# Patient Record
Sex: MALE | Race: ASIAN | NOT HISPANIC OR LATINO | Employment: FULL TIME | URBAN - METROPOLITAN AREA
[De-identification: names, ages, dates, MRNs, and addresses within clinical notes are randomized per-mention and may not be internally consistent; named-entity substitution may affect disease eponyms.]

---

## 2020-01-22 ENCOUNTER — OFFICE VISIT - HEALTHEAST (OUTPATIENT)
Dept: FAMILY MEDICINE | Facility: CLINIC | Age: 50
End: 2020-01-22

## 2020-01-22 DIAGNOSIS — R73.9 HYPERGLYCEMIA: ICD-10-CM

## 2020-01-22 DIAGNOSIS — R10.13 EPIGASTRIC PAIN: ICD-10-CM

## 2020-01-22 DIAGNOSIS — Z09 FOLLOW-UP EXAMINATION: ICD-10-CM

## 2020-01-22 LAB
ALBUMIN SERPL-MCNC: 3.6 G/DL (ref 3.5–5)
ALP SERPL-CCNC: 83 U/L (ref 45–120)
ALT SERPL W P-5'-P-CCNC: 39 U/L (ref 0–45)
ANION GAP SERPL CALCULATED.3IONS-SCNC: 11 MMOL/L (ref 5–18)
AST SERPL W P-5'-P-CCNC: 24 U/L (ref 0–40)
BILIRUB SERPL-MCNC: 0.6 MG/DL (ref 0–1)
BUN SERPL-MCNC: 16 MG/DL (ref 8–22)
CALCIUM SERPL-MCNC: 8.7 MG/DL (ref 8.5–10.5)
CHLORIDE BLD-SCNC: 101 MMOL/L (ref 98–107)
CHOLEST SERPL-MCNC: 224 MG/DL
CO2 SERPL-SCNC: 24 MMOL/L (ref 22–31)
CREAT SERPL-MCNC: 0.86 MG/DL (ref 0.7–1.3)
ERYTHROCYTE [DISTWIDTH] IN BLOOD BY AUTOMATED COUNT: 11.3 % (ref 11–14.5)
FASTING STATUS PATIENT QL REPORTED: YES
GFR SERPL CREATININE-BSD FRML MDRD: >60 ML/MIN/1.73M2
GLUCOSE BLD-MCNC: 296 MG/DL (ref 70–125)
HBA1C MFR BLD: >14 % (ref 3.5–6)
HCT VFR BLD AUTO: 42.8 % (ref 40–54)
HDLC SERPL-MCNC: 49 MG/DL
HGB BLD-MCNC: 14.6 G/DL (ref 14–18)
LDLC SERPL CALC-MCNC: 147 MG/DL
MCH RBC QN AUTO: 28.4 PG (ref 27–34)
MCHC RBC AUTO-ENTMCNC: 34.1 G/DL (ref 32–36)
MCV RBC AUTO: 83 FL (ref 80–100)
PLATELET # BLD AUTO: 200 THOU/UL (ref 140–440)
PMV BLD AUTO: 7.2 FL (ref 7–10)
POTASSIUM BLD-SCNC: 4.6 MMOL/L (ref 3.5–5)
PROT SERPL-MCNC: 7.6 G/DL (ref 6–8)
RBC # BLD AUTO: 5.13 MILL/UL (ref 4.4–6.2)
SODIUM SERPL-SCNC: 136 MMOL/L (ref 136–145)
TRIGL SERPL-MCNC: 141 MG/DL
TSH SERPL DL<=0.005 MIU/L-ACNC: 1.75 UIU/ML (ref 0.3–5)
WBC: 4.5 THOU/UL (ref 4–11)

## 2020-01-22 ASSESSMENT — MIFFLIN-ST. JEOR: SCORE: 1328.67

## 2020-01-24 ENCOUNTER — COMMUNICATION - HEALTHEAST (OUTPATIENT)
Dept: FAMILY MEDICINE | Facility: CLINIC | Age: 50
End: 2020-01-24

## 2020-01-29 ENCOUNTER — OFFICE VISIT - HEALTHEAST (OUTPATIENT)
Dept: PHARMACY | Facility: CLINIC | Age: 50
End: 2020-01-29

## 2020-01-29 ENCOUNTER — OFFICE VISIT - HEALTHEAST (OUTPATIENT)
Dept: FAMILY MEDICINE | Facility: CLINIC | Age: 50
End: 2020-01-29

## 2020-01-29 DIAGNOSIS — E11.65 TYPE 2 DIABETES MELLITUS WITH HYPERGLYCEMIA, WITHOUT LONG-TERM CURRENT USE OF INSULIN (H): ICD-10-CM

## 2020-01-29 ASSESSMENT — MIFFLIN-ST. JEOR: SCORE: 1322.81

## 2020-02-06 ENCOUNTER — RECORDS - HEALTHEAST (OUTPATIENT)
Dept: ADMINISTRATIVE | Facility: OTHER | Age: 50
End: 2020-02-06

## 2020-02-12 ENCOUNTER — RECORDS - HEALTHEAST (OUTPATIENT)
Dept: ADMINISTRATIVE | Facility: OTHER | Age: 50
End: 2020-02-12

## 2020-02-14 ENCOUNTER — OFFICE VISIT - HEALTHEAST (OUTPATIENT)
Dept: PHARMACY | Facility: CLINIC | Age: 50
End: 2020-02-14

## 2020-02-14 ENCOUNTER — COMMUNICATION - HEALTHEAST (OUTPATIENT)
Dept: FAMILY MEDICINE | Facility: CLINIC | Age: 50
End: 2020-02-14

## 2020-02-14 DIAGNOSIS — E78.2 MIXED HYPERLIPIDEMIA: ICD-10-CM

## 2020-02-14 DIAGNOSIS — R73.9 HYPERGLYCEMIA: ICD-10-CM

## 2020-02-14 DIAGNOSIS — E11.65 TYPE 2 DIABETES MELLITUS WITH HYPERGLYCEMIA, WITHOUT LONG-TERM CURRENT USE OF INSULIN (H): ICD-10-CM

## 2020-02-14 DIAGNOSIS — K85.90 ACUTE PANCREATITIS, UNSPECIFIED COMPLICATION STATUS, UNSPECIFIED PANCREATITIS TYPE: ICD-10-CM

## 2020-02-14 LAB
CREAT UR-MCNC: 40.6 MG/DL
MICROALBUMIN UR-MCNC: 10.43 MG/DL (ref 0–1.99)
MICROALBUMIN/CREAT UR: 256.9 MG/G

## 2020-02-20 ENCOUNTER — OFFICE VISIT - HEALTHEAST (OUTPATIENT)
Dept: FAMILY MEDICINE | Facility: CLINIC | Age: 50
End: 2020-02-20

## 2020-02-20 DIAGNOSIS — Z11.4 SCREENING FOR HIV (HUMAN IMMUNODEFICIENCY VIRUS): ICD-10-CM

## 2020-02-20 DIAGNOSIS — R80.9 MICROALBUMINURIA: ICD-10-CM

## 2020-02-20 DIAGNOSIS — K85.90 ACUTE PANCREATITIS, UNSPECIFIED COMPLICATION STATUS, UNSPECIFIED PANCREATITIS TYPE: ICD-10-CM

## 2020-02-20 DIAGNOSIS — E78.2 MIXED HYPERLIPIDEMIA: ICD-10-CM

## 2020-02-20 DIAGNOSIS — E11.65 TYPE 2 DIABETES MELLITUS WITH HYPERGLYCEMIA, WITHOUT LONG-TERM CURRENT USE OF INSULIN (H): ICD-10-CM

## 2020-02-20 DIAGNOSIS — Z09 HOSPITAL DISCHARGE FOLLOW-UP: ICD-10-CM

## 2020-02-20 DIAGNOSIS — Z23 IMMUNIZATION DUE: ICD-10-CM

## 2020-02-20 DIAGNOSIS — K81.0 ACUTE CHOLECYSTITIS: ICD-10-CM

## 2020-02-20 LAB
ALBUMIN SERPL-MCNC: 3.5 G/DL (ref 3.5–5)
ALP SERPL-CCNC: 127 U/L (ref 45–120)
ALT SERPL W P-5'-P-CCNC: 42 U/L (ref 0–45)
ANION GAP SERPL CALCULATED.3IONS-SCNC: 12 MMOL/L (ref 5–18)
AST SERPL W P-5'-P-CCNC: 22 U/L (ref 0–40)
BILIRUB DIRECT SERPL-MCNC: 0.2 MG/DL
BILIRUB SERPL-MCNC: 0.5 MG/DL (ref 0–1)
BUN SERPL-MCNC: 23 MG/DL (ref 8–22)
CALCIUM SERPL-MCNC: 8.9 MG/DL (ref 8.5–10.5)
CHLORIDE BLD-SCNC: 104 MMOL/L (ref 98–107)
CO2 SERPL-SCNC: 25 MMOL/L (ref 22–31)
CREAT SERPL-MCNC: 1.09 MG/DL (ref 0.7–1.3)
GFR SERPL CREATININE-BSD FRML MDRD: >60 ML/MIN/1.73M2
GLUCOSE BLD-MCNC: 152 MG/DL (ref 70–125)
HIV 1+2 AB+HIV1 P24 AG SERPL QL IA: NEGATIVE
POTASSIUM BLD-SCNC: 4.2 MMOL/L (ref 3.5–5)
PROT SERPL-MCNC: 7.5 G/DL (ref 6–8)
SODIUM SERPL-SCNC: 141 MMOL/L (ref 136–145)

## 2020-02-20 RX ORDER — LOSARTAN POTASSIUM 25 MG/1
25 TABLET ORAL DAILY
Qty: 90 TABLET | Refills: 1 | Status: SHIPPED | OUTPATIENT
Start: 2020-02-20 | End: 2021-06-16

## 2020-02-24 ENCOUNTER — COMMUNICATION - HEALTHEAST (OUTPATIENT)
Dept: FAMILY MEDICINE | Facility: CLINIC | Age: 50
End: 2020-02-24

## 2020-03-05 ENCOUNTER — OFFICE VISIT - HEALTHEAST (OUTPATIENT)
Dept: FAMILY MEDICINE | Facility: CLINIC | Age: 50
End: 2020-03-05

## 2020-03-05 ENCOUNTER — RECORDS - HEALTHEAST (OUTPATIENT)
Dept: ADMINISTRATIVE | Facility: OTHER | Age: 50
End: 2020-03-05

## 2020-03-05 DIAGNOSIS — R80.9 MICROALBUMINURIA: ICD-10-CM

## 2020-03-05 DIAGNOSIS — E11.65 TYPE 2 DIABETES MELLITUS WITH HYPERGLYCEMIA, WITHOUT LONG-TERM CURRENT USE OF INSULIN (H): ICD-10-CM

## 2020-03-05 DIAGNOSIS — K81.0 ACUTE CHOLECYSTITIS: ICD-10-CM

## 2020-03-05 DIAGNOSIS — K85.90 ACUTE PANCREATITIS, UNSPECIFIED COMPLICATION STATUS, UNSPECIFIED PANCREATITIS TYPE: ICD-10-CM

## 2020-03-06 ENCOUNTER — AMBULATORY - HEALTHEAST (OUTPATIENT)
Dept: FAMILY MEDICINE | Facility: CLINIC | Age: 50
End: 2020-03-06

## 2020-03-06 ENCOUNTER — AMBULATORY - HEALTHEAST (OUTPATIENT)
Dept: EDUCATION SERVICES | Facility: CLINIC | Age: 50
End: 2020-03-06

## 2020-03-06 DIAGNOSIS — E11.65 TYPE 2 DIABETES MELLITUS WITH HYPERGLYCEMIA, WITHOUT LONG-TERM CURRENT USE OF INSULIN (H): ICD-10-CM

## 2020-03-17 ENCOUNTER — AMBULATORY - HEALTHEAST (OUTPATIENT)
Dept: FAMILY MEDICINE | Facility: CLINIC | Age: 50
End: 2020-03-17

## 2020-03-17 DIAGNOSIS — E11.65 TYPE 2 DIABETES MELLITUS WITH HYPERGLYCEMIA, WITHOUT LONG-TERM CURRENT USE OF INSULIN (H): ICD-10-CM

## 2020-03-26 ENCOUNTER — OFFICE VISIT - HEALTHEAST (OUTPATIENT)
Dept: FAMILY MEDICINE | Facility: CLINIC | Age: 50
End: 2020-03-26

## 2020-03-26 DIAGNOSIS — K85.90 ACUTE PANCREATITIS, UNSPECIFIED COMPLICATION STATUS, UNSPECIFIED PANCREATITIS TYPE: ICD-10-CM

## 2020-03-26 DIAGNOSIS — K81.0 ACUTE CHOLECYSTITIS: ICD-10-CM

## 2020-03-26 DIAGNOSIS — R73.9 HYPERGLYCEMIA: ICD-10-CM

## 2020-03-26 DIAGNOSIS — R80.9 MICROALBUMINURIA: ICD-10-CM

## 2020-03-26 DIAGNOSIS — E11.65 TYPE 2 DIABETES MELLITUS WITH HYPERGLYCEMIA, WITHOUT LONG-TERM CURRENT USE OF INSULIN (H): ICD-10-CM

## 2020-04-10 ENCOUNTER — AMBULATORY - HEALTHEAST (OUTPATIENT)
Dept: PHARMACY | Facility: CLINIC | Age: 50
End: 2020-04-10

## 2020-04-10 DIAGNOSIS — R73.9 HYPERGLYCEMIA: ICD-10-CM

## 2020-04-10 DIAGNOSIS — E11.65 TYPE 2 DIABETES MELLITUS WITH HYPERGLYCEMIA, WITHOUT LONG-TERM CURRENT USE OF INSULIN (H): ICD-10-CM

## 2020-04-10 RX ORDER — ATORVASTATIN CALCIUM 40 MG/1
40 TABLET, FILM COATED ORAL DAILY
Qty: 90 TABLET | Refills: 2 | Status: SHIPPED | OUTPATIENT
Start: 2020-04-10 | End: 2021-06-16

## 2020-08-25 ENCOUNTER — COMMUNICATION - HEALTHEAST (OUTPATIENT)
Dept: FAMILY MEDICINE | Facility: CLINIC | Age: 50
End: 2020-08-25

## 2020-11-23 ENCOUNTER — OFFICE VISIT - HEALTHEAST (OUTPATIENT)
Dept: FAMILY MEDICINE | Facility: CLINIC | Age: 50
End: 2020-11-23

## 2020-11-23 DIAGNOSIS — K81.9 CHOLECYSTITIS: ICD-10-CM

## 2020-11-23 DIAGNOSIS — E78.2 MIXED HYPERLIPIDEMIA: ICD-10-CM

## 2020-11-23 DIAGNOSIS — R80.9 MICROALBUMINURIA: ICD-10-CM

## 2020-11-23 DIAGNOSIS — E11.65 TYPE 2 DIABETES MELLITUS WITH HYPERGLYCEMIA, WITHOUT LONG-TERM CURRENT USE OF INSULIN (H): ICD-10-CM

## 2020-11-23 DIAGNOSIS — Z87.19 HISTORY OF PANCREATITIS: ICD-10-CM

## 2020-11-23 DIAGNOSIS — Z12.11 SCREEN FOR COLON CANCER: ICD-10-CM

## 2020-11-23 LAB
ALBUMIN SERPL-MCNC: 3.9 G/DL (ref 3.5–5)
ALP SERPL-CCNC: 92 U/L (ref 45–120)
ALT SERPL W P-5'-P-CCNC: 50 U/L (ref 0–45)
ANION GAP SERPL CALCULATED.3IONS-SCNC: 10 MMOL/L (ref 5–18)
AST SERPL W P-5'-P-CCNC: 30 U/L (ref 0–40)
BILIRUB SERPL-MCNC: 0.4 MG/DL (ref 0–1)
BUN SERPL-MCNC: 27 MG/DL (ref 8–22)
CALCIUM SERPL-MCNC: 9.2 MG/DL (ref 8.5–10.5)
CHLORIDE BLD-SCNC: 104 MMOL/L (ref 98–107)
CO2 SERPL-SCNC: 23 MMOL/L (ref 22–31)
CREAT SERPL-MCNC: 1.15 MG/DL (ref 0.7–1.3)
CREAT UR-MCNC: 30 MG/DL
GFR SERPL CREATININE-BSD FRML MDRD: >60 ML/MIN/1.73M2
GLUCOSE BLD-MCNC: 254 MG/DL (ref 70–125)
HBA1C MFR BLD: 9.7 %
LIPASE SERPL-CCNC: 76 U/L (ref 0–52)
MICROALBUMIN UR-MCNC: 12.71 MG/DL (ref 0–1.99)
MICROALBUMIN/CREAT UR: 423.7 MG/G
POTASSIUM BLD-SCNC: 4.9 MMOL/L (ref 3.5–5)
PROT SERPL-MCNC: 7.5 G/DL (ref 6–8)
SODIUM SERPL-SCNC: 137 MMOL/L (ref 136–145)

## 2020-11-23 RX ORDER — GLUCOSAMINE HCL/CHONDROITIN SU 500-400 MG
CAPSULE ORAL
Qty: 100 STRIP | Refills: 6 | Status: SHIPPED | OUTPATIENT
Start: 2020-11-23

## 2020-11-23 RX ORDER — GLIPIZIDE 5 MG/1
5 TABLET ORAL 2 TIMES DAILY
Qty: 180 TABLET | Refills: 3 | Status: SHIPPED | OUTPATIENT
Start: 2020-11-23 | End: 2021-06-16

## 2020-11-24 ENCOUNTER — RECORDS - HEALTHEAST (OUTPATIENT)
Dept: RADIOLOGY | Facility: CLINIC | Age: 50
End: 2020-11-24

## 2020-11-24 ENCOUNTER — HOSPITAL ENCOUNTER (OUTPATIENT)
Dept: ULTRASOUND IMAGING | Facility: HOSPITAL | Age: 50
Discharge: HOME | End: 2020-11-24
Attending: PHYSICIAN ASSISTANT

## 2020-11-24 DIAGNOSIS — K81.9 CHOLECYSTITIS: ICD-10-CM

## 2020-11-25 ENCOUNTER — OFFICE VISIT - HEALTHEAST (OUTPATIENT)
Dept: SURGERY | Facility: CLINIC | Age: 50
End: 2020-11-25

## 2020-11-25 DIAGNOSIS — R10.11 ABDOMINAL PAIN, RIGHT UPPER QUADRANT: ICD-10-CM

## 2021-01-04 ENCOUNTER — COMMUNICATION - HEALTHEAST (OUTPATIENT)
Dept: FAMILY MEDICINE | Facility: CLINIC | Age: 51
End: 2021-01-04

## 2021-05-27 ENCOUNTER — RECORDS - HEALTHEAST (OUTPATIENT)
Dept: ADMINISTRATIVE | Facility: CLINIC | Age: 51
End: 2021-05-27

## 2021-05-27 VITALS — DIASTOLIC BLOOD PRESSURE: 73 MMHG | HEART RATE: 80 BPM | SYSTOLIC BLOOD PRESSURE: 118 MMHG

## 2021-06-02 ENCOUNTER — RECORDS - HEALTHEAST (OUTPATIENT)
Dept: ADMINISTRATIVE | Facility: CLINIC | Age: 51
End: 2021-06-02

## 2021-06-04 VITALS
DIASTOLIC BLOOD PRESSURE: 79 MMHG | RESPIRATION RATE: 16 BRPM | HEIGHT: 61 IN | SYSTOLIC BLOOD PRESSURE: 132 MMHG | BODY MASS INDEX: 25.3 KG/M2 | WEIGHT: 134 LBS | HEART RATE: 92 BPM

## 2021-06-04 VITALS
DIASTOLIC BLOOD PRESSURE: 70 MMHG | TEMPERATURE: 97.7 F | WEIGHT: 135 LBS | OXYGEN SATURATION: 98 % | SYSTOLIC BLOOD PRESSURE: 122 MMHG | BODY MASS INDEX: 25.16 KG/M2 | HEART RATE: 86 BPM

## 2021-06-04 VITALS
BODY MASS INDEX: 24.84 KG/M2 | TEMPERATURE: 98.7 F | OXYGEN SATURATION: 97 % | HEIGHT: 62 IN | RESPIRATION RATE: 12 BRPM | HEART RATE: 82 BPM | SYSTOLIC BLOOD PRESSURE: 118 MMHG | WEIGHT: 135 LBS | DIASTOLIC BLOOD PRESSURE: 68 MMHG

## 2021-06-04 VITALS
WEIGHT: 135.02 LBS | HEART RATE: 77 BPM | OXYGEN SATURATION: 97 % | TEMPERATURE: 98.1 F | DIASTOLIC BLOOD PRESSURE: 78 MMHG | SYSTOLIC BLOOD PRESSURE: 136 MMHG | BODY MASS INDEX: 25.17 KG/M2

## 2021-06-04 VITALS — WEIGHT: 134 LBS | BODY MASS INDEX: 24.98 KG/M2

## 2021-06-05 VITALS
BODY MASS INDEX: 24.79 KG/M2 | WEIGHT: 133 LBS | SYSTOLIC BLOOD PRESSURE: 139 MMHG | HEART RATE: 75 BPM | DIASTOLIC BLOOD PRESSURE: 78 MMHG

## 2021-06-05 NOTE — PROGRESS NOTES
"ASSESSMENT/PLAN:  1. Type 2 diabetes mellitus with hyperglycemia, without long-term current use of insulin (H)  Ambulatory referral to Diabetes Education Program (CDE)    insulin glargine (LANTUS SOLOSTAR PEN) 100 unit/mL (3 mL) pen    pen needle, diabetic (BD ULTRA-FINE WALKER PEN NEEDLE) 32 gauge x 5/32\" Ndle    blood glucose meter (GLUCOMETER)    blood glucose test strips    generic lancets (FINGERSTIX LANCETS)       This is a 50 yo male here for follow up on diabetes; was seen in ER last week ([place]):   I have reviewed available ER records,  notes, as well as imaging and lab results.  In addition I have reviewed the medication list and made any adjustments as indicated in orders.  He was seen for abdominal pain - no clear finding - but did have hyperglycemia - comes in for evaluation/treatment.    Type II DM - Will refer to diabetes education .  Will add refills on Lantus - will make sure he has blood sugar monitoring equipment (refills sent).  He is also on Metformin - encourage compliance.     Return in about 3 months (around 4/29/2020) for Recheck.      There are no discontinued medications.  There are no Patient Instructions on file for this visit.    Chief Complaint:  Chief Complaint   Patient presents with     Follow-up     labs        HPI:   Pérez Dumont is a 49 y.o. male c/o  Here for recheck diabetes - was seen in ER ([facility]) last week 1/20/2020  Started Metformin - taking it   No hypoglycemia  No foot lesions; no numbness/tingling    No fam hx or prev hx diabetes        PMH:   Patient Active Problem List    Diagnosis Date Noted     Type 2 diabetes mellitus with hyperglycemia, without long-term current use of insulin (H) 01/29/2020     Mixed Hyperlipoproteinemia      Allergic Rhinitis      Constipation      History reviewed. No pertinent past medical history.  History reviewed. No pertinent surgical history.  Social History     Socioeconomic History     Marital status:      Spouse name: Not on " file     Number of children: Not on file     Years of education: Not on file     Highest education level: Not on file   Occupational History     Not on file   Social Needs     Financial resource strain: Not on file     Food insecurity:     Worry: Not on file     Inability: Not on file     Transportation needs:     Medical: Not on file     Non-medical: Not on file   Tobacco Use     Smoking status: Never Smoker     Smokeless tobacco: Never Used   Substance and Sexual Activity     Alcohol use: Not on file     Drug use: Not on file     Sexual activity: Not on file   Lifestyle     Physical activity:     Days per week: Not on file     Minutes per session: Not on file     Stress: Not on file   Relationships     Social connections:     Talks on phone: Not on file     Gets together: Not on file     Attends Jew service: Not on file     Active member of club or organization: Not on file     Attends meetings of clubs or organizations: Not on file     Relationship status: Not on file     Intimate partner violence:     Fear of current or ex partner: Not on file     Emotionally abused: Not on file     Physically abused: Not on file     Forced sexual activity: Not on file   Other Topics Concern     Not on file   Social History Narrative     Not on file     History reviewed. No pertinent family history.    Meds:    Current Outpatient Medications:      metFORMIN (GLUCOPHAGE) 500 MG tablet, Take 1 tablet (500 mg total) by mouth 2 (two) times a day with meals., Disp: 60 tablet, Rfl: 0     ranitidine (ZANTAC) 150 MG capsule, Take 2 capsules (300 mg total) by mouth every evening., Disp: 60 capsule, Rfl: 0     blood glucose meter (GLUCOMETER), Use 1 each As Directed as needed. Dispense glucometer brand per patient's insurance at pharmacy discretion., Disp: 1 each, Rfl: 0     blood glucose test strips, Use 1 each As Directed as needed. Dispense brand per patient's insurance at pharmacy discretion., Disp: 100 strip, Rfl: 6     generic  "lancets (FINGERSTIX LANCETS), Dispense brand per patient's insurance at pharmacy discretion., Disp: 100 each, Rfl: 6     insulin glargine (LANTUS SOLOSTAR PEN) 100 unit/mL (3 mL) pen, Inject 10 Units under the skin at bedtime., Disp: 5 adj dose pen, Rfl: PRN     pen needle, diabetic (BD ULTRA-FINE WALKER PEN NEEDLE) 32 gauge x 5/32\" Ndle, Use to inject insulin once daily, Disp: 100 each, Rfl: 11    Allergies:  No Known Allergies    ROS:  Pertinent positives as noted in HPI; otherwise 12 point ROS negative.      Physical Exam:  EXAM:  /79 (Patient Site: Right Arm, Patient Position: Sitting, Cuff Size: Adult Regular)   Pulse 92   Resp 16   Ht 5' 1.42\" (1.56 m)   Wt 134 lb (60.8 kg)   BMI 24.98 kg/m     Gen:  NAD, appears well, well-hydrated  HEENT:  TMs nl, oropharynx benign, nasal mucosa nl, conjunctiva clear  Neck:  Supple, no adenopathy, no thyromegaly, no carotid bruits, no JVD  Lungs:  Clear to auscultation bilaterally  Cor:  RRR no murmur  Abd:  Soft, nontender, BS+, no masses, no guarding or rebound, no HSM  Extr:  Neg.  Diabetic foot exam: normal DP and PT pulses, no trophic changes or ulcerative lesions and normal sensory exam  Neuro:  No asymmetry  Skin:  Warm/dry        Results:  Results for orders placed or performed in visit on 01/22/20   Glycosylated Hemoglobin A1c   Result Value Ref Range    Hemoglobin A1c >14.0 (H) 3.5 - 6.0 %   Thyroid Stimulating Hormone (TSH)   Result Value Ref Range    TSH 1.75 0.30 - 5.00 uIU/mL   HM2(CBC w/o Differential)   Result Value Ref Range    WBC 4.5 4.0 - 11.0 thou/uL    RBC 5.13 4.40 - 6.20 mill/uL    Hemoglobin 14.6 14.0 - 18.0 g/dL    Hematocrit 42.8 40.0 - 54.0 %    MCV 83 80 - 100 fL    MCH 28.4 27.0 - 34.0 pg    MCHC 34.1 32.0 - 36.0 g/dL    RDW 11.3 11.0 - 14.5 %    Platelets 200 140 - 440 thou/uL    MPV 7.2 7.0 - 10.0 fL   Lipid Cascade   Result Value Ref Range    Cholesterol 224 (H) <=199 mg/dL    Triglycerides 141 <=149 mg/dL    HDL Cholesterol 49 >=40 " mg/dL    LDL Calculated 147 (H) <=129 mg/dL    Patient Fasting > 8hrs? Yes    Comprehensive Metabolic Panel   Result Value Ref Range    Sodium 136 136 - 145 mmol/L    Potassium 4.6 3.5 - 5.0 mmol/L    Chloride 101 98 - 107 mmol/L    CO2 24 22 - 31 mmol/L    Anion Gap, Calculation 11 5 - 18 mmol/L    Glucose 296 (H) 70 - 125 mg/dL    BUN 16 8 - 22 mg/dL    Creatinine 0.86 0.70 - 1.30 mg/dL    GFR MDRD Af Amer >60 >60 mL/min/1.73m2    GFR MDRD Non Af Amer >60 >60 mL/min/1.73m2    Bilirubin, Total 0.6 0.0 - 1.0 mg/dL    Calcium 8.7 8.5 - 10.5 mg/dL    Protein, Total 7.6 6.0 - 8.0 g/dL    Albumin 3.6 3.5 - 5.0 g/dL    Alkaline Phosphatase 83 45 - 120 U/L    AST 24 0 - 40 U/L    ALT 39 0 - 45 U/L

## 2021-06-05 NOTE — PROGRESS NOTES
Please call patient and inform:  Your diabetes lab is showing that it is very poorly controlled. I think we are going to need more than just the Metformin. I would also like to start you on once daily insulin just until we get things under better control. In addition we should start on a cholesterol medication. Would recommend you follow up soon to discuss. We can send you to the diabetic educator to discuss the insulin and demonstrate use.

## 2021-06-05 NOTE — PROGRESS NOTES
[facility] clinic EXAM note      Chief Complaint   Patient presents with     Hospital Visit Follow Up     abdominal pain , high blood sugar 400 in the ER     Concerns     feeling better today now that he isn't eating heavy food     Concerns     needs work note       Assessment & Plan    Pérez was seen today for hospital visit follow up, concerns and concerns.    Diagnoses and all orders for this visit:    Hyperglycemia:  Newly diagnosed diabetes. Assess LFT and kidney function. Check A1c. Assess thyroid. Assess for infection as contributing to hyperglycemia. Continue on Metformin for now. At the latest f/u in 3 months for recheck pending labs today. Reviewed diet changes that need to happen. Handout provided.    -     Glycosylated Hemoglobin A1c  -     Thyroid Stimulating Hormone (TSH)  -     HM2(CBC w/o Differential)  -     Lipid Cascade  -     Comprehensive Metabolic Panel    Epigastric pain:  Much improved. Suspect heartburn/gastritis vs new onset diabetes. No red flag s/s at this time. Continue on Ranitidine. I assume if he was able to pick this up it was not one of the recalled formularies.     Follow-up examination:  Symptoms improved as above.         History    Pérez Dumont is a 49 y.o.  male who presents for the following issues:    Establishing Care: Previously seen at this clinic by Dr. Lockett. Has been >3 years. States he was previously healthy so wasn't following up.    Concerns Today: F/u ED visit on 1/20 for abdominal pain  Feels a lot better today. After not eating heavy food. Just a litte pain central chest.   No N/V/D. No hematochezia, melena  NO fevers.   No CP or shortness of breath.   Just epigastric pain. Improved with meds.   No numbness or tingling  No neuropathy  Told BS was very high at the ED. Believes its from Drinking energy drinks.   Told no pop or energy drinks.    Feels like he is a pretty healthy so hasn' been seen.     I have reviewed available ED records,  notes,  as well as imaging  and lab results.      mEDICATIONS    Current Outpatient Medications on File Prior to Visit   Medication Sig Dispense Refill     metFORMIN (GLUCOPHAGE) 500 MG tablet Take 1 tablet (500 mg total) by mouth 2 (two) times a day with meals. 60 tablet 0     ranitidine (ZANTAC) 150 MG capsule Take 2 capsules (300 mg total) by mouth every evening. 60 capsule 0     No current facility-administered medications on file prior to visit.        Pertinent past medical, surgical, social and family history reviewed and updated in Epic.    Social History     Socioeconomic History     Marital status:      Spouse name: Not on file     Number of children: Not on file     Years of education: Not on file     Highest education level: Not on file   Occupational History     Not on file   Social Needs     Financial resource strain: Not on file     Food insecurity:     Worry: Not on file     Inability: Not on file     Transportation needs:     Medical: Not on file     Non-medical: Not on file   Tobacco Use     Smoking status: Never Smoker     Smokeless tobacco: Never Used   Substance and Sexual Activity     Alcohol use: Not on file     Drug use: Not on file     Sexual activity: Not on file   Lifestyle     Physical activity:     Days per week: Not on file     Minutes per session: Not on file     Stress: Not on file   Relationships     Social connections:     Talks on phone: Not on file     Gets together: Not on file     Attends Mandaeism service: Not on file     Active member of club or organization: Not on file     Attends meetings of clubs or organizations: Not on file     Relationship status: Not on file     Intimate partner violence:     Fear of current or ex partner: Not on file     Emotionally abused: Not on file     Physically abused: Not on file     Forced sexual activity: Not on file   Other Topics Concern     Not on file   Social History Narrative     Not on file     No past surgical history on file.  No past medical history on  "file.  No family history on file.        Review of systems    ROS: 14 pt review of systems preformed and all negative except noted in HP.    Physical Exam    /68 (Patient Site: Left Arm, Patient Position: Sitting, Cuff Size: Adult Small)   Pulse 82   Temp 98.7  F (37.1  C) (Oral)   Resp 12   Ht 5' 1.5\" (1.562 m)   Wt 135 lb (61.2 kg)   SpO2 97%   BMI 25.09 kg/m    GEN:  49 y.o. male sitting comfortably in no apparent distress.   HEENT: EOMI, no scleral icterus, buccal mucosa moist  Neck: Supple without lymphadenopathy or thyromegally   CHEST/LUNG: No respiratory distress, good air flow to bases, CTAB   CV: RRR, S1, S2 normal; no murmurs, rubs or gallops. No edema.   ABD:  Soft, non distended, no guarding,  No masses. + NBS. +epigastric TTP  MSK:  Strength grossly normal  SKIN: warm, dry, no rashes or lesions  NEURO: Gait normal, coordination intact  PSYCH:  Mood and affect appropriate        Adriana Colon      "

## 2021-06-05 NOTE — TELEPHONE ENCOUNTER
----- Message from Adriana Colon DO sent at 1/24/2020  8:33 AM CST -----  Please call patient and inform:  Your diabetes lab is showing that it is very poorly controlled. I think we are going to need more than just the Metformin. I would also like to start you on once daily insulin just until we get things under better control. In addition we should start on a cholesterol medication. Would recommend you follow up soon to discuss. We can send you to the diabetic educator to discuss the insulin and demonstrate use.

## 2021-06-05 NOTE — TELEPHONE ENCOUNTER
Patient is informed of the lab results, no further questions. appt was made for the pt to follow up with Dr. Ochoa this Weds at 8am.

## 2021-06-05 NOTE — PROGRESS NOTES
MTM Consult Encounter    Pérez Dumont is a 49 y.o. male referred for a clinical pharmacist consult from Dr. Ochoa for glucometer and insulin pen teaching.     Discussion:  Patient newly diagnosed with diabetes. Here to see Dr. Ochoa today. Plan is to start basal insulin. Pt was also referred to diabetes ed.     Diabetes:  Pt currently taking Metformin 500 mg two times a day. patient is not currently experiencing side effects.   SMBG: never      ACEi/ARB:  No urine microalbumin on file.   Statin: Not currently prescribed   Aspirin: Not currently prescribed    Diet/Exercise: Not discussed in detail today.     Lab Results   Component Value Date    HGBA1C >14.0 (H) 01/22/2020     Lab Results   Component Value Date    LDLCALC 147 (H) 01/22/2020    CREATININE 0.86 01/22/2020       Reviewed insulin pen teaching and glucometer teaching. Will start Lantus at recommended initial dose of 10 units (as weight based would be 12 units). Pt confirms understanding of insulin and glucometer use. Pt's daughter is an RN so she can help at home.     Will follow-up in 2 weeks for in-depth DM assessment.      Plan:  1. Start Lantus 10 units daily. Sent Rx for Lantus and Pen Needles   2. Pt to check fasting sugars daily. Sent glucometer, test strips, and lancets.       Follow up:   2 weeks with MTM   Pt to schedule with Diabetes Ed     Donavon Alaniz, Luis MiguelD  Medication Therapy Management (MTM) Pharmacist  [facility]        Current Outpatient Medications   Medication Sig Dispense Refill     blood glucose meter (GLUCOMETER) Use 1 each As Directed as needed. Dispense glucometer brand per patient's insurance at pharmacy discretion. 1 each 0     blood glucose test strips Use 1 each As Directed as needed. Dispense brand per patient's insurance at pharmacy discretion. 100 strip 6     generic lancets (FINGERSTIX LANCETS) Dispense brand per patient's insurance at pharmacy discretion. 100 each 6     insulin glargine (LANTUS  "SOLOSTAR PEN) 100 unit/mL (3 mL) pen Inject 10 Units under the skin at bedtime. 5 adj dose pen PRN     metFORMIN (GLUCOPHAGE) 500 MG tablet Take 1 tablet (500 mg total) by mouth 2 (two) times a day with meals. 60 tablet 0     pen needle, diabetic (BD ULTRA-FINE WALKER PEN NEEDLE) 32 gauge x 5/32\" Ndle Use to inject insulin once daily 100 each 11     ranitidine (ZANTAC) 150 MG capsule Take 2 capsules (300 mg total) by mouth every evening. 60 capsule 0     No current facility-administered medications for this visit.                         "

## 2021-06-06 NOTE — TELEPHONE ENCOUNTER
Form is completed and signed. Give the original form to patient. He will bring it to his workplace. Place the completed form copy to scan.     Please shred the coped forms.

## 2021-06-06 NOTE — PROGRESS NOTES
Subjective: This patient comes in for hospital discharge follow-up.  He was hospitalized from a very 6 through February 12 at [facility].    Patient had acute pancreatitis and likely had gallstones.  His MR CP showed no obvious obstruction    Initial labs showed   bilirubin 2.0 alkaline phosphatase 283.    Last A1c regarding diabetes was over 14 on 1/22/2020, TSH normal .    Patient is feeling better no abdominal pain he did return to work on the 14th I believe    I did fill out the FMLA form for him please see a copy of that.    Patient saw the pharmacist and had a microalbumin which was positive at 256 on 2/14/2020.  I did start him on losartan today 25 mg daily.    I did review hospital discharge.  Patient is to see [facility] for endoscopic ultrasound in 6 weeks.  I did give him a referral.    Blood sugars 1 30-1 50 fasting after meals at the 200 range.    Patient was stopped regarding the Lantus because of hypoglycemia he is on metformin at thousand milligrams twice a day now.    Labs today included HIV screening also BMP and hepatic.    Tobacco status: He  reports that he has never smoked. He has never used smokeless tobacco.    Patient Active Problem List    Diagnosis Date Noted     Type 2 diabetes mellitus with hyperglycemia, without long-term current use of insulin (H) 01/29/2020     Mixed Hyperlipoproteinemia      Allergic Rhinitis      Constipation        Current Outpatient Medications   Medication Sig Dispense Refill     atorvastatin (LIPITOR) 40 MG tablet Take 1 tablet (40 mg total) by mouth daily. 30 tablet 2     blood glucose meter (GLUCOMETER) Use 1 each As Directed as needed. Dispense glucometer brand per patient's insurance at pharmacy discretion. 1 each 0     blood glucose test strips Use 1 each As Directed as needed. Dispense brand per patient's insurance at pharmacy discretion. 100 strip 6     generic lancets (FINGERSTIX LANCETS) Dispense brand per patient's insurance  "at pharmacy discretion. 100 each 6     metFORMIN (GLUCOPHAGE) 1000 MG tablet Take 1 tablet (1,000 mg total) by mouth 2 (two) times a day with meals. 60 tablet 2     pen needle, diabetic (BD ULTRA-FINE WALKER PEN NEEDLE) 32 gauge x 5/32\" Ndle Use to inject insulin once daily 100 each 11     losartan (COZAAR) 25 MG tablet Take 1 tablet (25 mg total) by mouth daily. 90 tablet 1     No current facility-administered medications for this visit.        ROS: 10 point review of systems positive as outlined above otherwise negative.    CT of the abdomen showed right upper quadrant inflammation centered on the gallbladder with some peripancreatic stranding, gallbladder wall thickening.  No stones were noted on ultrasound.    MR CP was negative for biliary obstruction    Objective:    /70 (Patient Site: Left Arm, Patient Position: Sitting, Cuff Size: Adult Regular)   Pulse 86   Temp 97.7  F (36.5  C) (Oral)   Wt 135 lb (61.2 kg)   SpO2 98%   BMI 25.16 kg/m    Body mass index is 25.16 kg/m .      General appearance no acute distress    HEENT: Eyes without scleral icterus    Oropharynx is clear neck is supple no adenopathy or bruit    Lungs clear no rales or rhonchi heart was regular rate in the 80s no murmur    O2 sat 98%    Skin was normal no rashes no jaundice change    Abdomen soft nontender no guarding or rebound skin was normal no rashes.    No joint redness warmth or swelling.    Abdomen soft nontender no guarding or rebound no right upper quadrant pain.    Labs HIV was negative    Liver tests are now normal also renal function normal.    Results for orders placed or performed in visit on 02/20/20   HIV Antigen/Antibody Screening Cascade   Result Value Ref Range    HIV Antigen / Antibody Negative Negative   Basic Metabolic Panel   Result Value Ref Range    Sodium 141 136 - 145 mmol/L    Potassium 4.2 3.5 - 5.0 mmol/L    Chloride 104 98 - 107 mmol/L    CO2 25 22 - 31 mmol/L    Anion Gap, Calculation 12 5 - 18 " mmol/L    Glucose 152 (H) 70 - 125 mg/dL    Calcium 8.9 8.5 - 10.5 mg/dL    BUN 23 (H) 8 - 22 mg/dL    Creatinine 1.09 0.70 - 1.30 mg/dL    GFR MDRD Af Amer >60 >60 mL/min/1.73m2    GFR MDRD Non Af Amer >60 >60 mL/min/1.73m2   Hepatic Profile   Result Value Ref Range    Bilirubin, Total 0.5 0.0 - 1.0 mg/dL    Bilirubin, Direct 0.2 <=0.5 mg/dL    Protein, Total 7.5 6.0 - 8.0 g/dL    Albumin 3.5 3.5 - 5.0 g/dL    Alkaline Phosphatase 127 (H) 45 - 120 U/L    AST 22 0 - 40 U/L    ALT 42 0 - 45 U/L       Assessment:  1. Hospital discharge follow-up     2. Type 2 diabetes mellitus with hyperglycemia, without long-term current use of insulin (H)  Basic Metabolic Panel   3. Immunization due  Tdap vaccine,  8yo or older,  IM    Influenza, Recombinant, Inj, Quadrivalent, PF, 18+YRS   4. Screening for HIV (human immunodeficiency virus)  HIV Antigen/Antibody Screening [place]   5. Acute pancreatitis, unspecified complication status, unspecified pancreatitis type  Ambulatory referral to Gastroenterology    Hepatic Profile   6. Acute cholecystitis  Ambulatory referral to Gastroenterology    Hepatic Profile   7. Mixed Hyperlipoproteinemia     8. Microalbuminuria  losartan (COZAAR) 25 MG tablet     Hospital discharge follow-up for acute pancreatitis, also cholecystitis.  Presently with no symptoms and normalized liver tests.    Referral for endoscopic ultrasound to be done in another 4 weeks.    HIV negative    Immunization update with TD AP and flu    Diabetes mellitus seems to be in better control sugars are okay we will check a A1c in 4 weeks    Microalbumin start losartan  Plan: As outlined above await endoscopic ultrasound    See me in 4 weeks check A1c, BMP.    As outlined above    This transcription uses voice recognition software, which may contain typographical errors.

## 2021-06-06 NOTE — PROGRESS NOTES
Assessment:   --initial visit for Diabetes Education for Pérez  --exercises consistently: bikes/walks in the summer, uses treadmill at home, 15 minutes twice daily: am and after work  --eye exam 2 years ago, needs to go again, just received reminder  --dental exam yearly  --working 2-10:30pm, exercises for 15 minutes when he gets home, plus 15 minutes in the am  --sleeping 6-6.5 hours, no tobacco or ETOH use  --drinking 80+ oz water/day, no juice or soda  --states he noticed increased thirst/urination in months past, plus blurry vision, this has improved in the past month.  --consumes small bowl of rice or cereal with milk upon waking when he takes his morning medication, then does not eat again until 5pm, has rice/green beans/meat  --occasionally has fruit or chicken nuggets before work      Blood glucose record:  No meter brought to clinic  Per recall FBS     Medication  Prescribed:  Metformin 1000 mg twice daily    Education:  --reviewed Diabetes, A1C/Bg goals, discussed healthy eating, CHO intake, hydration, needs for a  Small meal before work.  --discussed exercise benefits, preventative care: eyes, feet, teeth,       Plan:   1. Test glucose twice daily: fasting and two hours post meal  2. Add in a small meal: fruit/vegetbles before leaving for work.  3. Continue with exercise 30+ minutes daily.  4. Schedule eye exam.   5. Follow up with PharmD on 4/10/20  5. Follow up with PharmD on 4/10/20    Subjective and Objective:      Pérez Dumont is referred by Marcellus Lockett for Diabetes Education.     Lab Results   Component Value Date    HGBA1C >14.0 (H) 01/22/2020       Wt Readings from Last 3 Encounters:   03/06/20 134 lb (60.8 kg)   03/05/20 135 lb 0.3 oz (61.2 kg)   02/20/20 135 lb (61.2 kg)                     Education:     Monitoring   Meter (per above goals): did not bring meter  Monitoring: Assessed and Discussed  BG goals: Assessed and Discussed    Nutrition Management  Nutrition Management: Assessed and  Discussed  Weight: Assessed  Portions/Balance: Assessed and Discussed  Heart Healthy Fats: Assessed and Discussed  Menu Planning: Assessed and Discussed    Physical Activity: Assessed and Discussed  Medications: Assessed and Discussed  Orals: Assessed and Discussed     Diabetes Disease Process: Assessed and Discussed    Acute Complications: Prevent, Detect, Treat:  Hypoglycemia: Assessed and Discussed  Hyperglycemia: Assessed, Discussed and Literature provided  Sick Days: Assessed  Driving: Not addressed    Chronic Complications  Foot Care:Assessed and Discussed  Skin Care: Assessed and Discussed  Eye: Assessed and Discussed  ABC: Assessed  Teeth:Assessed and Discussed  Goal Setting and Problem Solving: Assessed and Discussed  Barriers: Assessed and Discussed  Psychosocial Adjustments: Assessed and Discussed      Time spent with the patient: 30 minutes for diabetes education and counseling.   Previous Education: no  Visit Type:DSMT  Hours Remainin, DSMT 9.5 and MNT 3      Jacqueline Erickson  3/6/2020

## 2021-06-06 NOTE — PATIENT INSTRUCTIONS - HE
Recommendations from today's MTM visit:                                                    MTM (medication therapy management) is a service provided by a clinical pharmacist designed to help you get the most of out of your medicines. and Today we reviewed what your medicines are for, how to know if they are working, that your medicines are safe and how to make your medicine regimen as easy as possible.     1. Stop Lantus insulin.     2. Increase Metformin to 1000 mg two times a day. I did send a new prescription for this.     3. Continue checking your blood sugars two times a day. Bring your meter with you every time you come to the clinic.     4. Start Atorvastatin 20 mg daily. This is a medicine to lower your cholesterol and lower your risk of heart attack/stroke.       It was great to speak with you today.  I value your experience and would be very thankful for your time with providing feedback on our clinic survey. You may receive a survey via email or text message in the next few days.     Next MTM visit: Jacqueline will help you schedule your follow up with me.     To schedule another MTM appointment, please call the clinic directly or you may call the MTM scheduling line at [phone] or toll-free at [phone].     My Clinical Pharmacist's contact information:                                                      It was a pleasure seeing you today!  Please feel free to contact me with any questions or concerns you have.      Donavon Alaniz, PharmD  Medication Therapy Management (MTM) Pharmacist  [facility]

## 2021-06-06 NOTE — PROGRESS NOTES
Subjective: Patient comes in for follow-up.  He was hospitalized February 6 through the 12th at [facility] initial scanning showed pancreatic stranding and gallbladder wall thickening.  He had abdominal pain.    Discharge diagnosis was acute pancreatitis and cholecystitis.    At discharge she had normal lipase I did recheck that today.  I saw him on 2/20/2020 and liver tests were normal.    He still gets occasional pain in the right upper quadrant has slight tenderness.  He states that after he eats food even rice he will get some discomfort.    Denies any fever chills    He was supposed to follow-up for an endoscopic ultrasound 4 to 6 weeks after the discharge.  If there is any pancreatic lesions are small stones or sludge then he would go on to see a surgeon regarding cholecystectomy.    Patient has diabetes he follows up on his diabetes as well today and is on metformin thousand milligrams twice a day, A1c pending is also on losartan for microalbuminuria.  I did recheck A1c and lipase today    His weight is unchanged.    Tobacco status: He  reports that he has never smoked. He has never used smokeless tobacco.    Patient Active Problem List    Diagnosis Date Noted     Microalbuminuria 03/05/2020     Type 2 diabetes mellitus with hyperglycemia, without long-term current use of insulin (H) 01/29/2020     Mixed Hyperlipoproteinemia      Allergic Rhinitis      Constipation        Current Outpatient Medications   Medication Sig Dispense Refill     atorvastatin (LIPITOR) 40 MG tablet Take 1 tablet (40 mg total) by mouth daily. 30 tablet 2     blood glucose meter (GLUCOMETER) Use 1 each As Directed as needed. Dispense glucometer brand per patient's insurance at pharmacy discretion. 1 each 0     blood glucose test strips Check once daily.  Dispense brand per patient's insurance at pharmacy discretion. 100 strip 6     generic lancets (FINGERSTIX LANCETS) Check glucose once daily. Dispense brand per patient's  "insurance at pharmacy discretion. 100 each 6     losartan (COZAAR) 25 MG tablet Take 1 tablet (25 mg total) by mouth daily. 90 tablet 1     metFORMIN (GLUCOPHAGE) 1000 MG tablet Take 1 tablet (1,000 mg total) by mouth 2 (two) times a day with meals. 60 tablet 2     pen needle, diabetic (BD ULTRA-FINE WALKER PEN NEEDLE) 32 gauge x 5/32\" Ndle Use to inject insulin once daily 100 each 11     No current facility-administered medications for this visit.        ROS: 10 point review of systems positive as outlined above otherwise negative    Objective:    /78 (Patient Site: Left Arm, Patient Position: Sitting, Cuff Size: Adult Regular)   Pulse 77   Temp 98.1  F (36.7  C) (Oral)   Wt 135 lb 0.3 oz (61.2 kg)   SpO2 97%   BMI 25.17 kg/m    Body mass index is 25.17 kg/m .      General appearance no acute distress    HEENT neck supple no adenopathy oropharynx is clear eyes without scleral icterus    Skin without jaundice change    Extremities without edema    Lungs are clear throughout no rales or rhonchi O2 sat 97%    Heart is regular rate and 70s    Abdomen soft, no masses, slight tenderness right upper quadrant.  Back without CVA.    No joint redness warmth or swelling.    A1c and lipase pending    Labs from 2/20/2020 noted below with normal renal function and essentially normal liver test.    Results for orders placed or performed in visit on 02/20/20   HIV Antigen/Antibody Screening Cascade   Result Value Ref Range    HIV Antigen / Antibody Negative Negative   Basic Metabolic Panel   Result Value Ref Range    Sodium 141 136 - 145 mmol/L    Potassium 4.2 3.5 - 5.0 mmol/L    Chloride 104 98 - 107 mmol/L    CO2 25 22 - 31 mmol/L    Anion Gap, Calculation 12 5 - 18 mmol/L    Glucose 152 (H) 70 - 125 mg/dL    Calcium 8.9 8.5 - 10.5 mg/dL    BUN 23 (H) 8 - 22 mg/dL    Creatinine 1.09 0.70 - 1.30 mg/dL    GFR MDRD Af Amer >60 >60 mL/min/1.73m2    GFR MDRD Non Af Amer >60 >60 mL/min/1.73m2   Hepatic Profile   Result Value " Ref Range    Bilirubin, Total 0.5 0.0 - 1.0 mg/dL    Bilirubin, Direct 0.2 <=0.5 mg/dL    Protein, Total 7.5 6.0 - 8.0 g/dL    Albumin 3.5 3.5 - 5.0 g/dL    Alkaline Phosphatase 127 (H) 45 - 120 U/L    AST 22 0 - 40 U/L    ALT 42 0 - 45 U/L       Assessment:  1. Type 2 diabetes mellitus with hyperglycemia, without long-term current use of insulin (H)  blood glucose test strips    generic lancets (FINGERSTIX LANCETS)    Glycosylated Hemoglobin A1c    CANCELED: Basic Metabolic Panel   2. Acute pancreatitis, unspecified complication status, unspecified pancreatitis type  Lipase   3. Acute cholecystitis     4. Microalbuminuria       Diabetes mellitus, needs refill on supplies, await A1c.    History of probable acute pancreatitis and cholecystitis.  Needs endoscopic ultrasound for follow-up.  Consider cholecystectomy please see above discussion    I will see him back for recheck in 3 weeks    Microalbuminuria continue on losartan, normal renal function    Plan: As outlined above    This transcription uses voice recognition software, which may contain typographical errors.

## 2021-06-06 NOTE — PATIENT INSTRUCTIONS - HE
Check blood sugar in the morning before you eat: goal is .    Check blood sugar two hours after a meal: goal is

## 2021-06-06 NOTE — TELEPHONE ENCOUNTER
Patient dropped off LEAVE OF ABSENCE FORM for / PAMLELA to fill out. Placed the original copies in the DrErin's slot.    When forms are completed, patient would like it:    -Please call patient to let them know it's ready      Please re-route task back to the  to shred the copied forms and complete the task. Thanks!

## 2021-06-06 NOTE — TELEPHONE ENCOUNTER
I checked at the  to see if forms was completed and place back at the front, however, nothing was in the black box. I ask [employer] and they have not seen the forms. I retrieve the copy form and place it in Dr. Ochoa's inbox. Patient last saw you. PCP is Dr. Lockett. Are you willing to fill out the form.

## 2021-06-06 NOTE — TELEPHONE ENCOUNTER
Called and spoke with Pérez Dumont , Message was given, Pérez Dumont  understood, no further questions.      ----- Message from Marcellus Lockett MD sent at 2/23/2020  1:57 PM CST -----  Please contact this patient, let him know that the tests were all normal.    He has normal kidney function, blood sugar is 152 which is good    Also the liver tests have now come back to normal also.    Follow-up with me in 4 weeks

## 2021-06-07 NOTE — PROGRESS NOTES
"MTM Follow Up Encounter  Assessment & Plan                                                       Medication Adherence/Access: Needs improvement - reviewed refill process. Pt to call pharmacy when in need of refills. If the prescription needs more refills pharmacy will contact clinic. To improve adherence, will update prescriptions for Metformin and Atorvastatin to 90 day supply (Losartan and Aspirin already 90 days).     Diabetes: Needs- last A1C above goal <7%. Fasting sugars above goal , even with metformin. Have obviously worsened since running out of metformin. Will refill as noted above and have pt start this. Not checking 2-HrPP. As fasting sugars are above goal with metformin will likely need additional therapy, will have pt start checking PP sugars to help guide add-on therapy choice.     No recent BP. Last clinic reading was at goal <140/90. Appropriately on ARB for positive microalbumin. Appropriately on aspirin and high intensity statin given ASCVD risk score >10%.   -Pt to restart Metformin   -Pt check BG two times a day - AM and 2 HrPP       Follow Up  Return in about 1 week (around 4/17/2020) for Medication Management Pharmacist, Via Phone.        Subjective & Objective                                                     Pérez Dumont is a 50 y.o. male called for a follow up visit for Medication Therapy Management. He was referred to me from Mireya Gomez      Patient consented to a telehealth visit: Yes     Chief Complaint: Medication Management - No questions/concerns reported     Medication Adherence/Access: Pt familiar with his medications, however ran out of Metformin. Didn't know that he should call the pharmacy for refills. Thought he needed to connect with the clinic.     Diabetes: Pt currently prescribed Metformin 1000 mg two times a day. Lantus was stopped at last MTM visit in February. \"Ran out of metformin about 3-4 days ago - didn't have time to call for refills.\"  " patient is not currently experiencing side effects. Ran out of metformin about 3-4 days ago - didn't have time to call for refills.   SMBG: twice daily    Ranges (patient reported) :     Fastin-140s while he was taking metformin - yesterday was 156   After Meals:  Was recommended to start checking 2 hours after a meal when he saw Diabetes Ed, but has not started. Thought PCP told him not to.      Patient is not experiencing hypoglycemia   Recent symptoms of high blood sugar? Denies any symptoms since starting medications.   ACEi/ARB: Losartan 25 mg daily    Statin: Atorvastatin 40 mg daily   Aspirin: taking 81mg daily, prescribed on 3/26   Diet/Exercise: Working on low fat diet.     Lab Results   Component Value Date    HGBA1C >14.0 (H) 2020     Lab Results   Component Value Date    MICROALBUR 10.43 (H) 2020    LDLCALC 147 (H) 2020    CREATININE 1.09 2020     The 10-year ASCVD risk score (Ana RAMOS Jr., et al., 2013) is: 10.4%    Values used to calculate the score:      Age: 50 years      Sex: Male      Is Non- : No      Diabetic: Yes      Tobacco smoker: No      Systolic Blood Pressure: 136 mmHg      Is BP treated: No      HDL Cholesterol: 49 mg/dL      Total Cholesterol: 224 mg/dL       BP Readings from Last 3 Encounters:   20 136/78   20 122/70   20 118/73     Pulse Readings from Last 3 Encounters:   20 77   20 86   20 80     Lab Results   Component Value Date    ALT 42 2020    AST 22 2020    ALKPHOS 127 (H) 2020    BILITOT 0.5 2020         PMH: reviewed in EPIC   Allergies/ADRs: reviewed in EPIC   Alcohol:   Social History     Substance and Sexual Activity   Alcohol Use None        Tobacco:   Social History     Tobacco Use   Smoking Status Never Smoker   Smokeless Tobacco Never Used     Today's Vitals: There were no vitals filed for this visit.  ----------------    The patient declined an after visit  "summary    I spent 15 minutes with this patient today.   All changes were made via collaborative practice agreement with Marcellus Lockett MD. A copy of the visit note was provided to the patient's provider.     Donavon Alaniz, PharmD  Medication Therapy Management (MTM) Pharmacist  [facility]         Current Outpatient Medications   Medication Sig Dispense Refill     aspirin 81 MG EC tablet Take 1 tablet (81 mg total) by mouth daily. 90 tablet 3     atorvastatin (LIPITOR) 40 MG tablet Take 1 tablet (40 mg total) by mouth daily. 30 tablet 2     blood glucose meter (GLUCOMETER) Use 1 each As Directed as needed. Dispense glucometer brand per patient's insurance at pharmacy discretion. 1 each 0     blood glucose test strips Check once daily.  Dispense brand per patient's insurance at pharmacy discretion. 100 strip 6     generic lancets (FINGERSTIX LANCETS) Check glucose once daily. Dispense brand per patient's insurance at pharmacy discretion. 100 each 6     losartan (COZAAR) 25 MG tablet Take 1 tablet (25 mg total) by mouth daily. 90 tablet 1     metFORMIN (GLUCOPHAGE) 1000 MG tablet Take 1 tablet (1,000 mg total) by mouth 2 (two) times a day with meals. 60 tablet 2     pen needle, diabetic (BD ULTRA-FINE WALKER PEN NEEDLE) 32 gauge x 5/32\" Ndle Use to inject insulin once daily 100 each 11     No current facility-administered medications for this visit.              "

## 2021-06-07 NOTE — PROGRESS NOTES
"Pérez Dumont is a 50 y.o. male who is being evaluated via a billable telephone visit.      The patient has been notified of following:     \"This telephone visit will be conducted via a call between you and your physician/provider. We have found that certain health care needs can be provided without the need for a physical exam.  This service lets us provide the care you need with a short phone conversation.  If a prescription is necessary we can send it directly to your pharmacy.  If lab work is needed we can place an order for that and you can then stop by our lab to have the test done at a later time.    If during the course of the call the physician/provider feels a telephone visit is not appropriate, you will not be charged for this service.\"         Pérez Dumont complains of    Chief Complaint   Patient presents with     Follow-up     endoscopic us.        I have reviewed and updated the patient's Past Medical History, Social History, Family History and Medication List.    ALLERGIES  Patient has no known allergies.    Additional provider notes: This patient has diabetes last A1c back in January was over 14 but he has been on metformin thousand milligrams twice a day and his sugars are now in the 1 30-1 40 range in the morning he checks them daily.  Needs also on atorvastatin 40 mg a day    He is not been on aspirin, aspirin 81 mg a day will be started.    He takes losartan for the microalbumin.    Patient had acute pancreatitis and cholecystitis.  He was to have a endoscopic ultrasound but this had to be canceled because of the coronavirus pandemic.    At this point he does get some slight pain after eating and through the right upper quadrant    We discussed type of diet and he does better with just plain carbohydrates avoiding fried food red meat rich foods etc.  He will continue to do that.    He denies any fever shortness of breath or cough is had no exposures to coronavirus.  Has not had any recent " travel.    We discussed measures to help prevent exposure.    We also discussed should he have some worsening of his abdominal pain what to do i.e. call but if he gets quite severe he needed to go to the emergency room        Assessment/Plan:  1. Type 2 diabetes mellitus with hyperglycemia, without long-term current use of insulin (H)  generic lancets (FINGERSTIX LANCETS)    blood glucose test strips    aspirin 81 MG EC tablet   2. Hyperglycemia     3. Microalbuminuria     4. Acute pancreatitis, unspecified complication status, unspecified pancreatitis type     5. Acute cholecystitis       As outlined above regarding his history of cholecystitis and pancreatitis.  Once things settle down with the pandemic will get an endoscopic ultrasound and consider cholecystectomy.    Continue on metformin atorvastatin losartan and also start aspirin.    Recheck in 6 weeks if we are able to get him in for office visit we will do that otherwise we will have him come in for lab only A1c and do a telephone visit      Phone call duration: 12 minutes, 7:50 AM through 8:02 AM    Marcellus Lockett MD

## 2021-06-10 NOTE — TELEPHONE ENCOUNTER
----- Message from Donavon Alaniz PharmD sent at 8/25/2020  3:23 PM CDT -----  Patient overdue for MTM follow-up on diabetes. Please call to schedule follow-up.    Thanks!   Donavon Alaniz, PharmD  Medication Therapy Management (MTM) Pharmacist  [facility]

## 2021-06-10 NOTE — TELEPHONE ENCOUNTER
Unable to LM at [phone] due to voice message casimiro is full . Sending letter out and postponing task out to 2 weeks and will try again if an appointment hasn't been made.

## 2021-06-11 NOTE — TELEPHONE ENCOUNTER
Unable to LM at [phone] due to VOICE MESSAGE BOX IS FULL, NOT ABLE TO LEAVE A MESSAGE  . We have made several attempts to contact patient by phone and letter to schedule an appointment. Unfortunately, our calls have not been returned and we were unable to schedule. At this time, we will no longer make an attempt to schedule this appointment. Completing task.

## 2021-06-13 NOTE — PROGRESS NOTES
Subjective:    Pérez Dumont is a 50 y.o. male who presents with chief complaint of gallbladder pain.    Gallbladder surgery postponed in March due to COVID. Notices pain in the gallbladder area 10-20 minutes after eating. Rates 6/10 in severity. Solid foods tend to be more painful than softer foods. Avoids spicy or fried foods. No alcohol use. Overall pain has not improved since March. Working at [employer] 40 hours a week. Has a difficult time managing pain at work when he has a long shift. Pt hoping to get a note so that he can limit his work days to no more than 8 hours.     Checking blood sugars two times a day- once fasting in the morning and the other 2 hours after eating. Fasting blood sugars are 110-120. Post meal blood sugars are 210-220. Taking 1000 mg metformin BID. Taking atorvastatin 40 mg daily.    Does not have a blood pressure cuff at home. Taking losartan 25 mg daily. Currently not taking baby aspirin because he thought it was for headaches.     Would like to get the flu shot and pneumonia shot. Not interested in colon cancer screening.     Patient Active Problem List   Diagnosis     Mixed Hyperlipoproteinemia     Allergic Rhinitis     Constipation     Type 2 diabetes mellitus with hyperglycemia, without long-term current use of insulin (H)     Microalbuminuria       Current Outpatient Medications:      atorvastatin (LIPITOR) 40 MG tablet, Take 1 tablet (40 mg total) by mouth daily., Disp: 90 tablet, Rfl: 2     blood glucose meter (GLUCOMETER), Use 1 each As Directed as needed. Dispense glucometer brand per patient's insurance at pharmacy discretion., Disp: 1 each, Rfl: 0     blood glucose test strips, Check once daily.  Dispense brand per patient's insurance at pharmacy discretion., Disp: 100 strip, Rfl: 6     generic lancets (FINGERSTIX LANCETS), Check glucose once daily. Dispense brand per patient's insurance at pharmacy discretion., Disp: 100 each, Rfl: 6     losartan (COZAAR) 25 MG tablet, Take  "1 tablet (25 mg total) by mouth daily., Disp: 90 tablet, Rfl: 1     metFORMIN (GLUCOPHAGE) 1000 MG tablet, Take 1 tablet (1,000 mg total) by mouth 2 (two) times a day with meals., Disp: 180 tablet, Rfl: 2     pen needle, diabetic (BD ULTRA-FINE WALKER PEN NEEDLE) 32 gauge x 5/32\" Ndle, Use to inject insulin once daily, Disp: 100 each, Rfl: 11     aspirin 81 MG EC tablet, Take 1 tablet (81 mg total) by mouth daily., Disp: 90 tablet, Rfl: 3     glipiZIDE (GLUCOTROL) 5 MG tablet, Take 1 tablet (5 mg total) by mouth 2 (two) times a day., Disp: 180 tablet, Rfl: 3     Objective:   Allergies:  Patient has no known allergies.    Vitals:  Vitals:    11/23/20 0923 11/23/20 0925   BP: 144/80 139/78   Patient Site: Left Arm Left Arm   Patient Position: Sitting Sitting   Cuff Size: Adult Regular Adult Regular   Pulse: 78 75   Weight: 133 lb (60.3 kg)      Body mass index is 24.79 kg/m .    Vital signs reviewed.    All normal as below except abnormalities include: Pain with palpation to right upper quadrant of abdomen.   General is a  50 y.o. male sitting comfortably in no apparent distress and wearing a mask for COVID precautions.   CV: Regular rate and rhythm S1S2 without rubs, murmurs or gallops,   Lungs: Clear to auscultation bilaterally  Abd:  +BS, soft ND,  No masses or organomegaly,   Extremities: Warm, No Edema, 2+ Pedal and radial pulses bilaterally  Skin: No lesions or rashes noted  Neuro: Able to ambulate around the exam room with equal movement, strength and normal coordination of the upper and lower extremeties symmetrically      Results for orders placed or performed in visit on 11/23/20   Glycosylated Hemoglobin A1c   Result Value Ref Range    Hemoglobin A1c 9.7 (H) <=5.6 %       Assessment and Plan:     Pérez was seen today for follow up gall bladder pain.    Diagnoses and all orders for this visit:    1. Cholecystitis  Previous gallbladder surgery cancelled due to COVID. Continues to experience RUQ pain 10-20 " minutes after eating. Interested in rescheduling his surgery. Referral sent to general surgery. Per pt request, wrote work note to limit work shifts to 8 hours until he can have surgery.   -     Ambulatory referral to General Surgery    2. History of pancreatitis  Previous lipase testing normal. Recheck level today.  -     Lipase    3. Type 2 diabetes mellitus with hyperglycemia, without long-term current use of insulin (H)  Previously fairly controlled.  Addressed smoking status and aspirin therapy.  Recommended annual eye exam and dental cares. Reviewed foot cares and foot exam.  Blood pressure and lipid management reviewed today.  Vaccines reviewed and updated.  Plan for glucose management includes ongoing focus on healthy diabetic diet and increased activity.  Labs ordered as below. Goal hemoglobin A1c <7; today his level is 9.7. Continue metformin 1000 mg two times a day and start glipizide 5 mg two times a day. Reviewed glipizide administration and side effects. Refilled lancets and test strips. Pt has worked with diabetes educator in the past and does not feel like he needs another appointment at this time. Follow-up in 3 months to recheck hemoglobin A1c.   -     Comprehensive Metabolic Panel  -     Microalbumin, Random Urine  -     Glycosylated Hemoglobin A1c  -     glipiZIDE (GLUCOTROL) 5 MG tablet; Take 1 tablet (5 mg total) by mouth 2 (two) times a day.  -     generic lancets (FINGERSTIX LANCETS); Check glucose once daily. Dispense brand per patient's insurance at pharmacy discretion.  -     blood glucose test strips; Check once daily.  Dispense brand per patient's insurance at pharmacy discretion.  Lab Results   Component Value Date    HGBA1C 9.7 (H) 11/23/2020   , No results found for: LDL,   Lab Results   Component Value Date    CREATININE 1.09 02/20/2020       4. Microalbuminuria  See above.  -     Microalbumin, Random Urine    5. Mixed Hyperlipoproteinemia  Taking Lipitor 40 mg daily. Pt confused  about aspirin and thought that it was only to be used if he had a headache. Reviewed reason for aspirin usage and pt is planning to purchase OTC.  -     Comprehensive Metabolic Panel    6. Screen for colon cancer  Reviewed colon cancer screening options, but is not interested in pursuing screening at this time. Readdress at next visit.     Other orders  Received flu vaccine and pneumococcal vaccine.   -     Pneumococcal polysaccharide vaccine 23-valent 1 yo or older, subq/IM  -     Influenza, Recombinant, Inj, Quadrivalent, PF, 18+YRS    Familia Lucia  DNP-FNP Student  [facility]    I was present for history, pertinent exam findings, and assessment/plan discussion and treatment.  SUNNY VenegasC

## 2021-06-13 NOTE — PROGRESS NOTES
I was consulted by Marcellus Lockett MD to evaluate this patients gall bladder.    HPI: Pérez Dumont is a 50 y.o. male who has been experiencing right upper quadrant abdominal pain after eating most every meal.  This pain comes on to 10 to 20 minutes after eating and he rates it as a 6 out of 10 in severity.  Solid foods are more painful and cause more symptoms and softer foods do.  He staying away from spicy foods and he does not drink alcohol.  This is been going on since February.  The patient is losing weight he is down approximately 6 pounds from 136 to a 130 pounds.   Mr. Dumont is status post a hospital stay in Feb 2020 with pancreatitis.  This was thought to be secondary to a passed gall stones at the time but no gall stones have ever been documented in his gall bladder back then or now, Last  11-14-20.    Gallbladder surgery postponed in March due to COVID.     Allergies:Patient has no known allergies.    Past Medical History:   Diagnosis Date     Diabetes mellitus (H)      Hypertension        No past surgical history on file.    CURRENT MEDS:    Current Outpatient Medications:      atorvastatin (LIPITOR) 40 MG tablet, Take 1 tablet (40 mg total) by mouth daily., Disp: 90 tablet, Rfl: 2     blood glucose meter (GLUCOMETER), Use 1 each As Directed as needed. Dispense glucometer brand per patient's insurance at pharmacy discretion., Disp: 1 each, Rfl: 0     blood glucose test strips, Check once daily.  Dispense brand per patient's insurance at pharmacy discretion., Disp: 100 strip, Rfl: 6     generic lancets (FINGERSTIX LANCETS), Check glucose once daily. Dispense brand per patient's insurance at pharmacy discretion., Disp: 100 each, Rfl: 6     glipiZIDE (GLUCOTROL) 5 MG tablet, Take 1 tablet (5 mg total) by mouth 2 (two) times a day., Disp: 180 tablet, Rfl: 3     losartan (COZAAR) 25 MG tablet, Take 1 tablet (25 mg total) by mouth daily., Disp: 90 tablet, Rfl: 1     metFORMIN (GLUCOPHAGE) 1000 MG tablet, Take  "1 tablet (1,000 mg total) by mouth 2 (two) times a day with meals., Disp: 180 tablet, Rfl: 2     pen needle, diabetic (BD ULTRA-FINE WALKER PEN NEEDLE) 32 gauge x 5/32\" Ndle, Use to inject insulin once daily, Disp: 100 each, Rfl: 11    Family History   Problem Relation Age of Onset     No Medical Problems Mother      No Medical Problems Father         reports that he has never smoked. He has never used smokeless tobacco. He reports previous alcohol use.    Review of Systems:  10 system were reviewed and all are within normal limits except for those listed in the HPI.      EXAM:  There were no vitals taken for this visit.   GENERAL: Well developed male   EYES: Anicteric Sclera,  EOMI  CARDIAC: RRR w/out murmur  CHEST/LUNG: Clear to ascultation, No wheezes  ABDOMEN: Soft with, +Bowel Sounds  NEURO:No focal deficits, ambulatory  LYMPH: No Axillary or inguinal Adenopathy  EXTREMITIES: Ambulatory, No lower extremity deformities      LABS:  Lab Results   Component Value Date    WBC 4.5 01/22/2020    HGB 14.6 01/22/2020    HCT 42.8 01/22/2020    MCV 83 01/22/2020     01/22/2020     INR/Prothrombin Time  Results from last 7 days   Lab Units 11/23/20  0943   LN-SODIUM mmol/L 137   LN-POTASSIUM mmol/L 4.9   LN-CHLORIDE mmol/L 104   LN-CO2 mmol/L 23   LN-BLOOD UREA NITROGEN mg/dL 27*   LN-CREATININE mg/dL 1.15   LN-CALCIUM mg/dL 9.2     Lab Results   Component Value Date    ALT 50 (H) 11/23/2020    AST 30 11/23/2020    ALKPHOS 92 11/23/2020    BILITOT 0.4 11/23/2020       IMAGES:    EXAM: US ABDOMEN LIMITED  LOCATION: [facility]  DATE/TIME: 11/24/2020 9:14 AM     INDICATION: Cholecystitis, unspecified  COMPARISON: None.  TECHNIQUE: Limited abdominal ultrasound.     FINDINGS:     GALLBLADDER: Normal. No gallstones, wall thickening, or pericholecystic fluid. Negative sonographic Wells's sign.     BILE DUCTS: No biliary dilatation. The common duct measures 4 mm.     LIVER: Normal parenchyma with " smooth contour. No focal mass.     RIGHT KIDNEY: No hydronephrosis.     PANCREAS: The visualized portions are normal.     No ascites.     IMPRESSION:   1.  Normal limited abdominal ultrasound.    Assessment/Plan: Pt with postprandial epigastric and right upper quadrant abdominal pain.  The symptoms do seem consistent with biliary colic however the normal ultrasounds are hard to ignore and it leaves me wondering if removal of his gallbladder is actually going to be helpful.  The further evaluate this question I would like to assess this patient with a hepatobiliary scan.  The can check for ejection fraction and if the patient becomes symptomatic with the injection of cholecystokinin then that would be good evidence that he would benefit from a laparoscopic cholecystectomy.  If the patient does not have an abnormal or symptomatically remarkable HIDA scan then I would recommend we work up some other issues such as performing an upper endoscopy to rule out peptic ulcers.         Godwin Zavala MD  [phone]  [facility] Department of Surgery

## 2021-06-14 NOTE — TELEPHONE ENCOUNTER
----- Message from Dalia Aviles PA-C sent at 1/3/2021  3:27 PM CST -----  Can you call pt and have him schedule Diabetes check with Dr. Lockett in about 2 weeks?

## 2021-06-14 NOTE — TELEPHONE ENCOUNTER
Unable to LM at [phone] due to VM box is full  . Sending letter out and postponing task out to 2 weeks and will try again if an appointment hasn't been made.

## 2021-06-14 NOTE — TELEPHONE ENCOUNTER
Unable to LM at [phone] due to vm is full  . We have made several attempts to contact patient by phone and letter to schedule an appointment. Unfortunately, our calls have not been returned and we were unable to schedule. At this time, we will no longer make an attempt to schedule this appointment. Completing task.

## 2021-06-16 ENCOUNTER — AMBULATORY - HEALTHEAST (OUTPATIENT)
Dept: FAMILY MEDICINE | Facility: CLINIC | Age: 51
End: 2021-06-16

## 2021-06-16 ENCOUNTER — OFFICE VISIT - HEALTHEAST (OUTPATIENT)
Dept: FAMILY MEDICINE | Facility: CLINIC | Age: 51
End: 2021-06-16

## 2021-06-16 DIAGNOSIS — Z12.11 SCREEN FOR COLON CANCER: ICD-10-CM

## 2021-06-16 DIAGNOSIS — E78.2 MIXED HYPERLIPIDEMIA: ICD-10-CM

## 2021-06-16 DIAGNOSIS — E11.65 TYPE 2 DIABETES MELLITUS WITH HYPERGLYCEMIA, WITHOUT LONG-TERM CURRENT USE OF INSULIN (H): ICD-10-CM

## 2021-06-16 DIAGNOSIS — R73.9 HYPERGLYCEMIA: ICD-10-CM

## 2021-06-16 DIAGNOSIS — R80.9 MICROALBUMINURIA: ICD-10-CM

## 2021-06-16 DIAGNOSIS — K81.9 CHOLECYSTITIS: ICD-10-CM

## 2021-06-16 LAB
HBA1C MFR BLD: 12 %
LIPASE SERPL-CCNC: 52 U/L (ref 0–52)

## 2021-06-16 RX ORDER — ATORVASTATIN CALCIUM 40 MG/1
40 TABLET, FILM COATED ORAL DAILY
Qty: 90 TABLET | Refills: 0 | Status: SHIPPED | OUTPATIENT
Start: 2021-06-16

## 2021-06-16 RX ORDER — LOSARTAN POTASSIUM 25 MG/1
25 TABLET ORAL DAILY
Qty: 90 TABLET | Refills: 0 | Status: SHIPPED | OUTPATIENT
Start: 2021-06-16

## 2021-06-16 RX ORDER — GLIPIZIDE 5 MG/1
5 TABLET ORAL 2 TIMES DAILY
Qty: 180 TABLET | Refills: 0 | Status: SHIPPED | OUTPATIENT
Start: 2021-06-16

## 2021-06-16 ASSESSMENT — MIFFLIN-ST. JEOR: SCORE: 1361.56

## 2021-06-17 ENCOUNTER — COMMUNICATION - HEALTHEAST (OUTPATIENT)
Dept: FAMILY MEDICINE | Facility: CLINIC | Age: 51
End: 2021-06-17

## 2021-06-17 NOTE — PATIENT INSTRUCTIONS - HE
Patient Instructions by Adriana Colon DO at 1/22/2020  9:00 AM     Author: Adriana Colon DO Service: -- Author Type: Physician    Filed: 1/22/2020  9:46 AM Encounter Date: 1/22/2020 Status: Signed    : Adriana Colon DO (Physician)       Patient Education     Diabetes: Understanding Carbohydrates, Fats, and Protein  Food is a source of fuel and nourishment for your body. Its also a source of pleasure. Having diabetes doesnt mean you have to eat special foods or give up desserts. Instead, your dietitian can show you how to plan meals to suit your body. To start, learn how different foods affect blood sugar.  Carbohydrates  Carbohydrates (carbs) are the main source of fuel for the body. They raise blood sugar. Many people think carbohydrates are only in pasta or bread. But carbohydrates are in many kinds of foods. Carbs include:    Sugars.These are naturally in foods such as fruit, milk, honey, and molasses. Sugars can also be added to many foods. They may be added to cereals and yogurt to candy and desserts. Sugars raise blood sugar.    Starches. These are in bread, cereals, pasta, and dried beans. Theyre found in corn, peas, potatoes, yam, acorn squash, and butternut squash. Starches raise blood sugar.     Fiber. This is in foods such as vegetables, fruits, beans, and whole grains. Unlike other carbs, fiber isnt digested or absorbed. So it doesnt raise blood sugar. In fact, fiber can help keep blood sugar from rising too fast. It helps keep blood cholesterol at a healthy level.  Did you know?  Even though carbohydrates raise blood sugar, its best to have some in every meal. They are an important part of a healthy diet.  Fat  Fat is an energy source that can be stored until needed. Fat does not raise blood sugar. But it can raise blood cholesterol. This increases the risk of heart disease. Fat is high in calories. Eating too many calories can cause weight gain. Not all types of fat are the same.  More  "healthy:    Monounsaturated fats. These are mostly found in vegetable oils, such as olive, canola, and peanut oils. They are found in avocados and some nuts. Monounsaturated fats are healthy for your heart. Thats because they lower LDL (unhealthy) cholesterol.    Polyunsaturated fats.These are mostly found in vegetable oils, such as corn, safflower, and soybean oils. They are found in some seeds, nuts, and fish. Polyunsaturated fats lower LDL (unhealthy) cholesterol. So, choosing them instead of saturated fats is healthy for your heart. Certain unsaturated fats can help lower triglycerides.   Less healthy:    Saturated fats.These are found in animal products, such as meat, poultry, whole milk, lard, and butter. Saturated fats raise LDL cholesterol.They are not healthy for your heart.    Hydrogenated oilsand trans fats. These are formed when vegetable oils are processed into solid fats. They are found in many processed foods. Hydrogenated oils and trans fats raise LDL (\"bad\") cholesterol and lower HDL (\"good\") cholesterol. They are not healthy for your heart.  Protein  Protein helps the body build and repair muscle and other tissue. Protein has little or no effect on blood sugar. But many foods that have protein also have saturated fat. By choosing low-fat protein sources, you can get the benefits of protein without the extra fat:    Plant protein. This is found in dry beans and peas, nuts, and soy products, such as tofu and soymilk. These foods tend to have no cholesterol.Most are low in saturated fat.    Animal protein. This is found in fish, poultry, meat, cheese, milk, and eggs. These foods have cholesterol.They can be high in saturated fat. Aim for lean, lower-fat choices. Don't eat fried foods.  Date Last Reviewed: 4/1/2019 2000-2019 [employer]. [address] [place] [address], [place] [address]. All rights reserved. This information is not intended as a substitute for professional medical care. Always " follow your healthcare professional's instructions.

## 2021-06-20 NOTE — LETTER
Letter by Marcellus Lockett MD at      Author: Marcellus Lockett MD Service: -- Author Type: --    Filed:  Encounter Date: 8/25/2020 Status: (Other)         Pérez Dumont  [address] [address]      August 25, 2020      Dear Pérez,    As a valued [facility] patient, your healthcare needs are our priority.  Your health care team has determined that you are due for an appointment regarding your your medication.    To help prevent delays in your care, please call the [facility] at [phone].    We look forward to partnering with you to achieve optimal health and wellbeing.    Sincerely,  Your care team at [facility]

## 2021-06-21 NOTE — LETTER
Letter by Dalia Aviles PA-C at      Author: Dalia Aviles PA-C Service: -- Author Type: --    Filed:  Encounter Date: 2020 Status: (Other)         2020     Patient: Pérez Dumont   YOB: 1970   Date of Visit: 2020       To Whom It May Concern:    Pérez Dumont was seen in my clinic on 2020.  He is only allowed to work 8 hours in a day, due to a medical condition.  These restrictions  in 4 months.    If you have any questions or concerns, please don't hesitate to call.    Sincerely,        Electronically signed by Dalia Aviles PA-C

## 2021-06-21 NOTE — LETTER
Letter by Dalia Aviles PA-C at      Author: Dalia Aviles PA-C Service: -- Author Type: --    Filed:  Encounter Date: 11/23/2020 Status: (Other)         November 23, 2020     Patient: Pérez Dumont   YOB: 1970   Date of Visit: 11/23/2020       To Whom it May Concern:    Pérez Dumont was seen in my clinic on 11/23/2020.  He is only allowed to work 8 hours in a day, due to a medical condition.    If you have any questions or concerns, please don't hesitate to call.    Sincerely,         Electronically signed by Dalia Aviles PA-C

## 2021-06-21 NOTE — LETTER
Letter by Marcellus Lockett MD at      Author: Marcellus Lockett MD Service: -- Author Type: --    Filed:  Encounter Date: 1/4/2021 Status: (Other)         Pérez Dumont  [address] [id number]      January 4, 2021      Dear Pérez,    As a valued [facility] patient, your healthcare needs are our priority.  Your health care team has determined that you are due for an appointment regarding your Diabetes.    To help prevent delays in your care, please call the [facility] at [phone].    We look forward to partnering with you to achieve optimal health and wellbeing.    Sincerely,  Your care team at [facility]

## 2021-06-26 NOTE — TELEPHONE ENCOUNTER
----- Message from Marcellus Lockett MD sent at 6/17/2021  9:23 AM CDT -----  Please contact this patient, let him know that the diabetic test A1c came back at 12.0 which means his blood sugars have been running many times in the 300 range.    He needs to take glipizide 5 mg 1 tablet at breakfast and one at supper    He needs to take Metformin 1000 mg 1 tablet at breakfast and one at supper.    He also needs to take his cholesterol medication atorvastatin 40 mg 1 tablet every day and the losartan 25 mg 1 tablet every day.    Although his prescriptions were sent yesterday to his pharmacy    Please schedule a follow-up visit with me 6 weeks from now. Keep track of blood sugars.    Come in fasting we will need to recheck the A1c as well as the urine for microalbumin and the cholesterol level and CMP

## 2021-06-26 NOTE — PROGRESS NOTES
Assessment & Plan     Pérez was seen today for abdominal pain.    Diagnoses and all orders for this visit:    Type 2 diabetes mellitus with hyperglycemia, without long-term current use of insulin (H)  -     atorvastatin (LIPITOR) 40 MG tablet; Take 1 tablet (40 mg total) by mouth daily.  -     glipiZIDE (GLUCOTROL) 5 MG tablet; Take 1 tablet (5 mg total) by mouth 2 (two) times a day.  -     Ambulatory referral to Ophthalmology - [facility]    Mixed Hyperlipoproteinemia    Microalbuminuria  -     losartan (COZAAR) 25 MG tablet; Take 1 tablet (25 mg total) by mouth daily.    Hyperglycemia  -     metFORMIN (GLUCOPHAGE) 1000 MG tablet; Take 1 tablet (1,000 mg total) by mouth 2 (two) times a day with meals.    Screen for colon cancer  -     [facility]; Future    Cholecystitis        Diabetes mellitus, question control patient has been off of medications for a while, he says 2 weeks and he is on lower dose, the glipizide 5 mg once a day and Metformin 1000 mg once a day. A1c pending    Hyperlipidemia question control he is not been taking the atorvastatin for the last couple weeks. We will have him get back on the atorvastatin and recheck LFTs and lipid in 6 weeks    Microalbumin urea, needs to get on the losartan he is not been taking it this was refilled.    Patient needs to see ophthalmology with his diabetes    Also [facility] for colon cancer screening    Patient be contacted with results and discuss follow-up       Return in about 3 months (around 9/16/2021) for Recheck.    Marcellus Lockett MD  [facility]    Subjective   Pérez Dumont is 51 y.o. and presents today for the following health issues   HPI   This patient comes in for evaluation.  Patient last had labs in November A1c was 9.7 CMP is normal ALT 50 microalbumin positive    He states that blood sugars in the morning 120 to 1:30 in the PM 1 40-1 60 and higher after meals.    He is on glipizide 5 mg taking 1 a day Metformin  "1000 mg 1 a day.  Atorvastatin 40 mg a day and losartan 25 mg.  1 a day    Patient states he has been out of medications for 2 weeks.    Patient had a virtual visit with Dr. Zavala regarding cholecystitis patient did have an ultrasound but never went on for nuclear medicine hepatobiliary scan.  He states that he is not having active symptoms regarding his abdomen.    He has had a letter stating he could not work over 8 hours last November by Dalia he wondered if he can get another letter.  I do not have any medical reason for this so was not able to do that for him    I did check labs today A1c and microalbumin we will also have him see [facility] and get a Cologuard done.          Review of Systems  Ten point review of systems positive as outlined above otherwise negative.    He has had his COVID-19 vaccinations      Objective    /76   Pulse 66   Temp 97.6  F (36.4  C) (Oral)   Resp 12   Ht 5' 3\" (1.6 m)   Wt 137 lb (62.1 kg)   BMI 24.27 kg/m    Body mass index is 24.27 kg/m .  Physical Exam  General appearance no acute distress    HEENT: Neck negative nontender oropharynx clear pupils react normally    Lungs clear no rales or rhonchi heart was regular S1-S2 rate in the 60s blood pressure look good O2 sat 97%    Abdomen nontender, no masses    Extremities without edema pulses are normal normal sensation no atrophic changes.      Skin is normal no rashes                "

## 2021-06-28 NOTE — PROGRESS NOTES
Progress Notes by Donavon Alaniz, PharmD at 2/14/2020  8:30 AM     Author: Donavon Alaniz, Javi Service: -- Author Type: Pharmacist    Filed: 2/14/2020  9:07 AM Encounter Date: 2/14/2020 Status: Signed    : Donavon Alaniz PharmD (Pharmacist)       [facility] Initial Encounter  Assessment & Plan                                                     Post Discharge Medication Reconciliation Status: discharge medications reconciled and changed, per note/orders (see AVS)    Medication Adherence/Access: No concerns noted.     Pancreatitis: Partially improved - pt no longer have abdominal pains since discharge. Given that he's not having pains, likely okay to continue without Ranitidine and Ibuprofen. Will have pt schedule PCP follow-up for RAUL.   -Okay to stop Ranitidine and Ibuprofen   -Pt to schedule with PCP in 1 week.     Diabetes: Partially improved - last A1C above goal <7%, but pt reports he's now having lows since starting Metformin and insulin. Hospital discharge summary also noted issues with hypoglycemia however they were giving significantly larger doses of insulin that pt was prescribed outpatient. Pt reports taking the 10 units prescribed at last MTM visit and still having lows.  Given that pt is continuing to have lows, will stop insulin today. Metformin is relatively low risk for hypoglycemia, so will increase dose to maintain adequate control. Will have pt follow-up with diabetes education to review low-fat, diabetic diet.     BP today at goal <130/80. Not currently on ACEi. Will obtain urine microalbumin today. Appropriately not on aspirin given risk score <10%. Given risk score of 7.5% is indicated for high intensity statin. Will start today.   -Stop Lantus   -Increase Metformin to 1000 mg two times a day   -Start Atorvastatin 40 mg daily   -Urine Microalbumin       Follow Up  Return in about 1 week (around 2/21/2020) for with PCP.  3 weeks with diabetes ed   Pt to schedule with PharmD after Diabetes Ed  visit.       Subjective & Objective                                                     Pérez Dumont is a 49 y.o. male coming in for an initial visit for Medication Therapy Management. He was referred to me from Mireya Gomez Patient was recently discharged from [facility] on 2020 for pancreatitis.      Chief Complaint: Medication Management - Transitions of Care     Medication Adherence/Access: Pt familiar with his medications. Reports no missed doses.     Pancreatitis: Was discharged with Ranitidine 150 mg 2 tabs at bedtime and ibuprofen 200 mg Q4-6 hours as needed. Pt does not have.  Denies any additional pains since coming home and declines need for medication.. Does not yet have follow-up scheduled with GI or PCP. Plan was to follow up with GI for repeat EUS in 6 weeks.      From discharge summary:      Diabetes: Relatively new diabetes diagnosis. Was seen on 20 by PharmD for insulin pen and glucometer teaching.  Pt currently prescribed Metformin 500 mg two times a day and Lantus 10 units daily. patient is not currently experiencing side effects.   SMBG: twice daily    Ranges (patient reported) :     Fastin-70   After Meals (about 3 minutes): 160-170s.    Patient is experiencing hypoglycemia. feeling very dizzy.  Is treating with apple juice.   Recent symptoms of high blood sugar? Denies any symptoms since starting medications.   ACEi/ARB:  Not currently prescribed. No microalbumin on file.   Statin: Not currently prescribed   Aspirin: Not taking due to not indicated.    Diet/Exercise: Working on low fat diet.     Lab Results   Component Value Date    HGBA1C >14.0 (H) 2020     Lab Results   Component Value Date    LDLCALC 147 (H) 2020    CREATININE 0.86 2020     The 10-year ASCVD risk score ([place] RACHEL Jr., et al., 2013) is: 9.1%    Values used to calculate the score:      Age: 49 years      Sex: Male      Is Non- : No      Diabetic: Yes      " Tobacco smoker: No      Systolic Blood Pressure: 132 mmHg      Is BP treated: No      HDL Cholesterol: 49 mg/dL      Total Cholesterol: 224 mg/dL    BP Readings from Last 3 Encounters:   02/14/20 118/73   01/29/20 132/79   01/22/20 118/68     Pulse Readings from Last 3 Encounters:   02/14/20 80   01/29/20 92   01/22/20 82         PMH: reviewed in EPIC   Allergies/ADRs: reviewed in EPIC   Alcohol:   Social History     Substance and Sexual Activity   Alcohol Use Not on file        Tobacco:   Social History     Tobacco Use   Smoking Status Never Smoker   Smokeless Tobacco Never Used     Today's Vitals: There were no vitals filed for this visit.  ----------------    The patient was given a summary of these recommendations as an after visit summary    I spent 30 minutes with this patient today.   All changes were made via collaborative practice agreement with Marcellus Lockett MD. A copy of the visit note was provided to the patient's provider.     Donavon Alaniz, Luis MiguelD  Medication Therapy Management (MTM) Pharmacist  [facility]         Current Outpatient Medications   Medication Sig Dispense Refill   ? blood glucose meter (GLUCOMETER) Use 1 each As Directed as needed. Dispense glucometer brand per patient's insurance at pharmacy discretion. 1 each 0   ? blood glucose test strips Use 1 each As Directed as needed. Dispense brand per patient's insurance at pharmacy discretion. 100 strip 6   ? generic lancets (FINGERSTIX LANCETS) Dispense brand per patient's insurance at pharmacy discretion. 100 each 6   ? insulin glargine (LANTUS SOLOSTAR PEN) 100 unit/mL (3 mL) pen Inject 10 Units under the skin at bedtime. 5 adj dose pen PRN   ? metFORMIN (GLUCOPHAGE) 500 MG tablet Take 1 tablet (500 mg total) by mouth 2 (two) times a day with meals. 60 tablet 0   ? pen needle, diabetic (BD ULTRA-FINE WALKER PEN NEEDLE) 32 gauge x 5/32\" Ndle Use to inject insulin once daily 100 each 11   ? ranitidine (ZANTAC) 150 MG " capsule Take 2 capsules (300 mg total) by mouth every evening. 60 capsule 0     No current facility-administered medications for this visit.

## 2021-07-04 NOTE — LETTER
Letter by Marcellus Lockett MD at      Author: Marcellus Lockett MD Service: -- Author Type: --    Filed:  Encounter Date: 6/17/2021 Status: (Other)       Parent/guardian of Pérez Dumont  [address] [facility] [address]             June 22, 2021         Dear alexandru Dumont,    Below are the results from Pérez's recent visit:    Resulted Orders   Lipase   Result Value Ref Range    Lipase 52 0 - 52 U/L   Glycosylated Hemoglobin A1c   Result Value Ref Range    Hemoglobin A1c 12.0 (H) <=5.6 %           Please call with questions or contact us using [employer].    Sincerely,        Electronically signed by Marcellus Lockett MD

## 2021-07-06 VITALS
RESPIRATION RATE: 12 BRPM | DIASTOLIC BLOOD PRESSURE: 76 MMHG | WEIGHT: 137 LBS | TEMPERATURE: 97.6 F | HEART RATE: 66 BPM | SYSTOLIC BLOOD PRESSURE: 127 MMHG | BODY MASS INDEX: 24.27 KG/M2 | HEIGHT: 63 IN

## 2023-11-25 ENCOUNTER — HOSPITAL ENCOUNTER (EMERGENCY)
Facility: HOSPITAL | Age: 53
Discharge: HOME | End: 2023-11-25
Attending: EMERGENCY MEDICINE | Admitting: EMERGENCY MEDICINE
Payer: COMMERCIAL

## 2023-11-25 VITALS
SYSTOLIC BLOOD PRESSURE: 189 MMHG | HEART RATE: 70 BPM | DIASTOLIC BLOOD PRESSURE: 82 MMHG | RESPIRATION RATE: 18 BRPM | WEIGHT: 143.3 LBS | BODY MASS INDEX: 25.38 KG/M2 | TEMPERATURE: 98 F | OXYGEN SATURATION: 98 %

## 2023-11-25 DIAGNOSIS — S05.01XA ABRASION OF RIGHT CORNEA, INITIAL ENCOUNTER: ICD-10-CM

## 2023-11-25 DIAGNOSIS — R03.0 ELEVATED BLOOD PRESSURE READING: ICD-10-CM

## 2023-11-25 DIAGNOSIS — Z86.39 HISTORY OF DIABETES MELLITUS: ICD-10-CM

## 2023-11-25 PROCEDURE — 99284 EMERGENCY DEPT VISIT MOD MDM: CPT

## 2023-11-25 PROCEDURE — 250N000009 HC RX 250: Performed by: EMERGENCY MEDICINE

## 2023-11-25 RX ORDER — TETRACAINE HYDROCHLORIDE 5 MG/ML
1 SOLUTION OPHTHALMIC ONCE
Status: COMPLETED | OUTPATIENT
Start: 2023-11-25 | End: 2023-11-25

## 2023-11-25 RX ORDER — ERYTHROMYCIN 5 MG/G
0.5 OINTMENT OPHTHALMIC 3 TIMES DAILY
Qty: 1 G | Refills: 0 | Status: SHIPPED | OUTPATIENT
Start: 2023-11-25

## 2023-11-25 RX ORDER — HYDROCODONE BITARTRATE AND ACETAMINOPHEN 5; 325 MG/1; MG/1
1 TABLET ORAL EVERY 6 HOURS PRN
Qty: 6 TABLET | Refills: 0 | Status: SHIPPED | OUTPATIENT
Start: 2023-11-25

## 2023-11-25 RX ORDER — ERYTHROMYCIN 5 MG/G
0.5 OINTMENT OPHTHALMIC 3 TIMES DAILY
Qty: 1 G | Refills: 0 | Status: SHIPPED | OUTPATIENT
Start: 2023-11-25 | End: 2023-11-25

## 2023-11-25 RX ORDER — HYDROCODONE BITARTRATE AND ACETAMINOPHEN 5; 325 MG/1; MG/1
1 TABLET ORAL EVERY 6 HOURS PRN
Qty: 6 TABLET | Refills: 0 | Status: SHIPPED | OUTPATIENT
Start: 2023-11-25 | End: 2023-11-25

## 2023-11-25 RX ADMIN — TETRACAINE HYDROCHLORIDE 1 DROP: 5 SOLUTION OPHTHALMIC at 14:37

## 2023-11-25 RX ADMIN — FLUORESCEIN SODIUM 1 STRIP: 1 STRIP OPHTHALMIC at 14:37

## 2023-11-25 ASSESSMENT — VISUAL ACUITY
OU: 1
OD: 20/40
OS: 20/100
OD: 20/100;OTHER (SEE COMMENTS)
OU: 20/70

## 2023-11-25 ASSESSMENT — TONOMETRY: OD_IOP_MMHG: 15

## 2023-11-25 ASSESSMENT — ENCOUNTER SYMPTOMS
EYE REDNESS: 1
PHOTOPHOBIA: 1
EYE PAIN: 1

## 2023-11-25 NOTE — ED PROVIDER NOTES
EMERGENCY DEPARTMENT ENCOUNTER      NAME: Pérez Dumont  AGE: 53 year old male  YOB: 1970  MRN: [id number]  EVALUATION DATE & TIME: No admission date for patient encounter.    PCP: No Ref-Primary, Physician    ED PROVIDER: Narcisa Ochoa M.D.      CHIEF COMPLAINT     Chief Complaint   Patient presents with     Eye Injury         FINAL IMPRESSION:     1. Abrasion of right cornea, initial encounter    2. Elevated blood pressure reading    3. History of diabetes mellitus          MEDICAL DECISION MAKING:       Pertinent Labs & Imaging studies reviewed. (See chart for details)    53 year old male presents to the Emergency Department for evaluation of right eye injury    History  Supplemental history from wife  External Record(s) review as documented below    Exam  Erythema right eye    Differential Diagnosis include but not limited to corneal abrasion fb open globe among others      Vital Signs: hypertensive  EKG:none  Imaging: none  Home meds: reviewed  ED meds: tetracaine flurosceine  Fluids: none  Labs:  none    Clinical Impression and Decision Making    54 yo male chopping wound felt something went into right eye  Visual acuity 20/100 right eye left eye but patient is no wearing his glasses  + up take on woods lamp  No fb  No open globe  Does not wear contacts  Rx erythromycin ointment norco for pain  Patient does have ophthalmologist to follow up  recommended prompt evaluation on Monday  Strict return precautions given     In addition to the work out documented, I considered the following work up CT orbit no evidence of penetrating eye injury           Medical Decision Making    History:    Supplemental history from: Documented in chart, if applicable    External Record(s) reviewed: Documented in chart, if applicable.    Work Up:    Chart documentation includes differential considered and any EKGs or imaging independently interpreted by provider, where specified.    In additional to work up documented,  I considered the following work up: Documented in chart, if applicable.    External consultation:    Discussion of management with another provider: Documented in chart, if applicable    Complicating factors:    Care impacted by chronic illness: Diabetes    Care affected by social determinants of health: N/A    Disposition considerations: Discharge. I prescribed additional prescription strength medication(s) as charted. I considered admission, but discharged patient after significant clinical improvement.          Review of External Records  H[facility]  Date: 20202 tdap      External Consultation        ED COURSE     3:15 PM met with patient and wife  Evaluated  Discussed elevated BP    At the conclusion of the encounter I discussed the results of all of the tests and the disposition. The questions were answered. The patient and wife acknowledged understanding and was agreeable with the care plan.         MEDICATIONS GIVEN IN THE EMERGENCY:     Medications   tetracaine (PONTOCAINE) 0.5 % ophthalmic solution 1 drop (1 drop Left Eye $Given by Other 11/25/23 1437)   fluorescein (FUL-GURPREET) ophthalmic strip 1 strip (1 strip Right Eye $Given by Other 11/25/23 1437)       NEW PRESCRIPTIONS STARTED AT TODAY'S ER VISIT     Discharge Medication List as of 11/25/2023  3:30 PM             =================================================================    HPI     Patient information was obtained from: patient and wife    Use of : N/A         Pérez Dumont is a 53 year old male who presents by private vehicle    Patient was chopping wound without goggles and felt a piece of wound went into his right eye.  No other trauma  Unknown last tdap  Eye hurst  Feels something is in eye    Patient wears prescription glasses but did not bring with him    No other complaints are voiced      REVIEW OF SYSTEMS   Review of Systems   Eyes: Positive for photophobia, pain and redness.   All other systems reviewed  "and are negative.       PAST MEDICAL HISTORY:     Past Medical History:   Diagnosis Date     Diabetes mellitus (H)      Hypertension        PAST SURGICAL HISTORY:   No past surgical history on file.      CURRENT MEDICATIONS:   erythromycin (ROMYCIN) 5 MG/GM ophthalmic ointment  HYDROcodone-acetaminophen (NORCO) 5-325 MG tablet  atorvastatin (LIPITOR) 40 MG tablet  blood glucose meter (GLUCOMETER)  blood glucose test strips  generic lancets (FINGERSTIX LANCETS)  glipiZIDE (GLUCOTROL) 5 MG tablet  losartan (COZAAR) 25 MG tablet  metFORMIN (GLUCOPHAGE) 1000 MG tablet  pen needle, diabetic (BD ULTRA-FINE WALKER PEN NEEDLE) 32 gauge x 5/32\" Ndle         ALLERGIES:   No Known Allergies    FAMILY HISTORY:     Family History   Problem Relation Age of Onset     No Known Problems Mother      No Known Problems Father        SOCIAL HISTORY:     Social History     Socioeconomic History     Marital status:    Tobacco Use     Smoking status: Never     Smokeless tobacco: Never   Substance and Sexual Activity     Alcohol use: Not Currently       VITALS:   BP (!) 189/82   Pulse 70   Temp 98  F (36.7  C) (Temporal)   Resp 18   Wt 65 kg (143 lb 4.8 oz)   SpO2 98%   BMI 25.38 kg/m      PHYSICAL EXAM     Physical Exam  Vitals and nursing note reviewed. Exam conducted with a chaperone present.   Constitutional:       General: He is not in acute distress.     Appearance: Normal appearance. He is normal weight. He is not ill-appearing, toxic-appearing or diaphoretic.      Comments: Holding towel to his right eye   HENT:      Head: Normocephalic and atraumatic.      Right Ear: External ear normal.      Left Ear: External ear normal.      Nose: Nose normal.   Eyes:      General: Lids are normal. Lids are everted, no foreign bodies appreciated. Vision grossly intact. Gaze aligned appropriately. No visual field deficit.        Right eye: No foreign body, discharge or hordeolum.      Intraocular pressure: Right eye pressure is 15 " mmHg.      Extraocular Movements: Extraocular movements intact.      Right eye: Normal extraocular motion and no nystagmus.      Conjunctiva/sclera:      Right eye: Right conjunctiva is injected. No chemosis, exudate or hemorrhage.     Pupils: Pupils are equal, round, and reactive to light.      Right eye: Corneal abrasion and fluorescein uptake present. Chapo exam negative.      Funduscopic exam:     Right eye: Exudate present.      Slit lamp exam:     Right eye: No corneal flare, corneal ulcer, foreign body, hyphema or hypopyon.        Comments: Conjunctival injection  No chemosis  Pupil round and equal  No fb  + corneal abrasion 9 oclock  No open globe  Tetracaine relieves pain   Cardiovascular:      Rate and Rhythm: Normal rate.   Pulmonary:      Effort: Pulmonary effort is normal.   Musculoskeletal:      Cervical back: Normal range of motion.   Neurological:      Mental Status: He is alert.                 LAB:     All pertinent labs reviewed and interpreted.  Labs Ordered and Resulted from Time of ED Arrival to Time of ED Departure - No data to display     RADIOLOGY:     Reviewed all pertinent imaging. Please see official radiology report.  No orders to display        EKG:       I have independently reviewed and interpreted the EKG(s) documented above.      PROCEDURES:     Procedures        Narcisa Ochoa M.D.  Emergency Medicine  [facility] EMERGENCY DEPARTMENT  [facility] [facility] [address] [phone]  Dept: [phone]       Narcisa Ochoa MD  11/25/23 6112

## 2023-11-25 NOTE — DISCHARGE INSTRUCTIONS
Read and follow the discharge instructions.    Apply the antibiotic ointment as instructed.    For severe pain you could use Norco descending Accardi medication do not drive or drink alcohol.    Call day eye doctor Monday to make a follow-up appointment.    Was elevated make sure you follow-up with your primary care doctor.    Return worsening pain or any other concerns.

## 2025-01-18 ENCOUNTER — APPOINTMENT (OUTPATIENT)
Dept: CT IMAGING | Facility: HOSPITAL | Age: 55
DRG: 065 | End: 2025-01-18
Attending: STUDENT IN AN ORGANIZED HEALTH CARE EDUCATION/TRAINING PROGRAM
Payer: COMMERCIAL

## 2025-01-18 ENCOUNTER — APPOINTMENT (OUTPATIENT)
Dept: MRI IMAGING | Facility: HOSPITAL | Age: 55
DRG: 065 | End: 2025-01-18
Attending: EMERGENCY MEDICINE
Payer: COMMERCIAL

## 2025-01-18 ENCOUNTER — HOSPITAL ENCOUNTER (INPATIENT)
Facility: HOSPITAL | Age: 55
LOS: 2 days | Discharge: HOME | DRG: 065 | End: 2025-01-20
Attending: EMERGENCY MEDICINE | Admitting: EMERGENCY MEDICINE
Payer: COMMERCIAL

## 2025-01-18 DIAGNOSIS — I63.9 ACUTE CVA (CEREBROVASCULAR ACCIDENT) (H): ICD-10-CM

## 2025-01-18 DIAGNOSIS — R53.1 LEFT-SIDED WEAKNESS: Primary | ICD-10-CM

## 2025-01-18 DIAGNOSIS — I10 HYPERTENSION, UNSPECIFIED TYPE: ICD-10-CM

## 2025-01-18 DIAGNOSIS — E11.69 TYPE 2 DIABETES MELLITUS WITH OTHER SPECIFIED COMPLICATION, WITHOUT LONG-TERM CURRENT USE OF INSULIN (H): ICD-10-CM

## 2025-01-18 PROBLEM — E11.65 TYPE 2 DIABETES MELLITUS WITH HYPERGLYCEMIA, WITHOUT LONG-TERM CURRENT USE OF INSULIN (H): Status: RESOLVED | Noted: 2020-01-29 | Resolved: 2025-01-18

## 2025-01-18 PROBLEM — H53.461 RIGHT HOMONYMOUS HEMIANOPSIA: Status: ACTIVE | Noted: 2025-01-18

## 2025-01-18 PROBLEM — H53.461 RIGHT HOMONYMOUS HEMIANOPSIA: Status: RESOLVED | Noted: 2025-01-18 | Resolved: 2025-01-18

## 2025-01-18 PROBLEM — M19.019 OSTEOARTHRITIS OF AC (ACROMIOCLAVICULAR) JOINT: Status: ACTIVE | Noted: 2021-07-06

## 2025-01-18 PROBLEM — I67.2 INTRACRANIAL ATHEROSCLEROSIS: Status: ACTIVE | Noted: 2025-01-18

## 2025-01-18 PROBLEM — E11.9 DM2 (DIABETES MELLITUS, TYPE 2) (H): Status: ACTIVE | Noted: 2020-02-07

## 2025-01-18 PROBLEM — I63.133 CEREBRAL INFARCTION DUE TO BILATERAL EMBOLISM OF CAROTID ARTERIES (H): Status: ACTIVE | Noted: 2025-01-18

## 2025-01-18 LAB
ANION GAP SERPL CALCULATED.3IONS-SCNC: 10 MMOL/L (ref 7–15)
APTT PPP: 25 SECONDS (ref 22–38)
BASOPHILS # BLD AUTO: 0.1 10E3/UL (ref 0–0.2)
BASOPHILS NFR BLD AUTO: 1 %
BUN SERPL-MCNC: 32 MG/DL (ref 6–20)
CALCIUM SERPL-MCNC: 8.7 MG/DL (ref 8.8–10.4)
CHLORIDE SERPL-SCNC: 109 MMOL/L (ref 98–107)
CHOLEST SERPL-MCNC: 389 MG/DL
CREAT SERPL-MCNC: 1.7 MG/DL (ref 0.67–1.17)
EGFRCR SERPLBLD CKD-EPI 2021: 47 ML/MIN/1.73M2
EOSINOPHIL # BLD AUTO: 0.1 10E3/UL (ref 0–0.7)
EOSINOPHIL NFR BLD AUTO: 1 %
ERYTHROCYTE [DISTWIDTH] IN BLOOD BY AUTOMATED COUNT: 11.9 % (ref 10–15)
EST. AVERAGE GLUCOSE BLD GHB EST-MCNC: 189 MG/DL
GLUCOSE BLDC GLUCOMTR-MCNC: 122 MG/DL (ref 70–99)
GLUCOSE BLDC GLUCOMTR-MCNC: 159 MG/DL (ref 70–99)
GLUCOSE BLDC GLUCOMTR-MCNC: 278 MG/DL (ref 70–99)
GLUCOSE SERPL-MCNC: 197 MG/DL (ref 70–99)
HBA1C MFR BLD: 8.2 %
HCO3 SERPL-SCNC: 22 MMOL/L (ref 22–29)
HCT VFR BLD AUTO: 43.3 % (ref 40–53)
HDLC SERPL-MCNC: 47 MG/DL
HGB BLD-MCNC: 14.8 G/DL (ref 13.3–17.7)
IMM GRANULOCYTES # BLD: 0 10E3/UL
IMM GRANULOCYTES NFR BLD: 0 %
INR PPP: 0.91 (ref 0.85–1.15)
LDLC SERPL CALC-MCNC: 295 MG/DL
LYMPHOCYTES # BLD AUTO: 1.8 10E3/UL (ref 0.8–5.3)
LYMPHOCYTES NFR BLD AUTO: 31 %
MCH RBC QN AUTO: 27.6 PG (ref 26.5–33)
MCHC RBC AUTO-ENTMCNC: 34.2 G/DL (ref 31.5–36.5)
MCV RBC AUTO: 81 FL (ref 78–100)
MONOCYTES # BLD AUTO: 0.4 10E3/UL (ref 0–1.3)
MONOCYTES NFR BLD AUTO: 6 %
NEUTROPHILS # BLD AUTO: 3.5 10E3/UL (ref 1.6–8.3)
NEUTROPHILS NFR BLD AUTO: 60 %
NONHDLC SERPL-MCNC: 342 MG/DL
NRBC # BLD AUTO: 0 10E3/UL
NRBC BLD AUTO-RTO: 0 /100
PLATELET # BLD AUTO: 278 10E3/UL (ref 150–450)
POTASSIUM SERPL-SCNC: 3.4 MMOL/L (ref 3.4–5.3)
RBC # BLD AUTO: 5.37 10E6/UL (ref 4.4–5.9)
SODIUM SERPL-SCNC: 141 MMOL/L (ref 135–145)
TRIGL SERPL-MCNC: 234 MG/DL
TROPONIN T SERPL HS-MCNC: 39 NG/L
TROPONIN T SERPL HS-MCNC: 41 NG/L
WBC # BLD AUTO: 5.7 10E3/UL (ref 4–11)

## 2025-01-18 PROCEDURE — 85025 COMPLETE CBC W/AUTO DIFF WBC: CPT | Performed by: STUDENT IN AN ORGANIZED HEALTH CARE EDUCATION/TRAINING PROGRAM

## 2025-01-18 PROCEDURE — 36415 COLL VENOUS BLD VENIPUNCTURE: CPT | Performed by: STUDENT IN AN ORGANIZED HEALTH CARE EDUCATION/TRAINING PROGRAM

## 2025-01-18 PROCEDURE — 255N000002 HC RX 255 OP 636: Performed by: EMERGENCY MEDICINE

## 2025-01-18 PROCEDURE — 99223 1ST HOSP IP/OBS HIGH 75: CPT | Performed by: EMERGENCY MEDICINE

## 2025-01-18 PROCEDURE — 0042T CT HEAD PERFUSION W CONTRAST: CPT

## 2025-01-18 PROCEDURE — 250N000011 HC RX IP 250 OP 636: Performed by: STUDENT IN AN ORGANIZED HEALTH CARE EDUCATION/TRAINING PROGRAM

## 2025-01-18 PROCEDURE — 83718 ASSAY OF LIPOPROTEIN: CPT | Performed by: PSYCHIATRY & NEUROLOGY

## 2025-01-18 PROCEDURE — 80048 BASIC METABOLIC PNL TOTAL CA: CPT | Performed by: STUDENT IN AN ORGANIZED HEALTH CARE EDUCATION/TRAINING PROGRAM

## 2025-01-18 PROCEDURE — 82962 GLUCOSE BLOOD TEST: CPT

## 2025-01-18 PROCEDURE — 250N000009 HC RX 250: Performed by: STUDENT IN AN ORGANIZED HEALTH CARE EDUCATION/TRAINING PROGRAM

## 2025-01-18 PROCEDURE — 85610 PROTHROMBIN TIME: CPT | Performed by: STUDENT IN AN ORGANIZED HEALTH CARE EDUCATION/TRAINING PROGRAM

## 2025-01-18 PROCEDURE — 83036 HEMOGLOBIN GLYCOSYLATED A1C: CPT | Performed by: PSYCHIATRY & NEUROLOGY

## 2025-01-18 PROCEDURE — 96376 TX/PRO/DX INJ SAME DRUG ADON: CPT

## 2025-01-18 PROCEDURE — 85041 AUTOMATED RBC COUNT: CPT | Performed by: STUDENT IN AN ORGANIZED HEALTH CARE EDUCATION/TRAINING PROGRAM

## 2025-01-18 PROCEDURE — 250N000013 HC RX MED GY IP 250 OP 250 PS 637: Performed by: PSYCHIATRY & NEUROLOGY

## 2025-01-18 PROCEDURE — 84484 ASSAY OF TROPONIN QUANT: CPT | Performed by: STUDENT IN AN ORGANIZED HEALTH CARE EDUCATION/TRAINING PROGRAM

## 2025-01-18 PROCEDURE — A9585 GADOBUTROL INJECTION: HCPCS | Performed by: EMERGENCY MEDICINE

## 2025-01-18 PROCEDURE — 99207 PR NO CHARGE LOS: CPT | Performed by: PHYSICIAN ASSISTANT

## 2025-01-18 PROCEDURE — 96374 THER/PROPH/DIAG INJ IV PUSH: CPT | Mod: 59

## 2025-01-18 PROCEDURE — 84484 ASSAY OF TROPONIN QUANT: CPT | Performed by: EMERGENCY MEDICINE

## 2025-01-18 PROCEDURE — 82465 ASSAY BLD/SERUM CHOLESTEROL: CPT | Performed by: PSYCHIATRY & NEUROLOGY

## 2025-01-18 PROCEDURE — 96375 TX/PRO/DX INJ NEW DRUG ADDON: CPT

## 2025-01-18 PROCEDURE — 99223 1ST HOSP IP/OBS HIGH 75: CPT | Performed by: PSYCHIATRY & NEUROLOGY

## 2025-01-18 PROCEDURE — 250N000011 HC RX IP 250 OP 636: Performed by: EMERGENCY MEDICINE

## 2025-01-18 PROCEDURE — 36415 COLL VENOUS BLD VENIPUNCTURE: CPT | Performed by: EMERGENCY MEDICINE

## 2025-01-18 PROCEDURE — 120N000001 HC R&B MED SURG/OB

## 2025-01-18 PROCEDURE — 250N000013 HC RX MED GY IP 250 OP 250 PS 637: Performed by: EMERGENCY MEDICINE

## 2025-01-18 PROCEDURE — 70553 MRI BRAIN STEM W/O & W/DYE: CPT

## 2025-01-18 PROCEDURE — 70496 CT ANGIOGRAPHY HEAD: CPT

## 2025-01-18 PROCEDURE — 93005 ELECTROCARDIOGRAM TRACING: CPT | Performed by: STUDENT IN AN ORGANIZED HEALTH CARE EDUCATION/TRAINING PROGRAM

## 2025-01-18 PROCEDURE — 85730 THROMBOPLASTIN TIME PARTIAL: CPT | Performed by: STUDENT IN AN ORGANIZED HEALTH CARE EDUCATION/TRAINING PROGRAM

## 2025-01-18 PROCEDURE — 99285 EMERGENCY DEPT VISIT HI MDM: CPT | Mod: 25

## 2025-01-18 RX ORDER — ATORVASTATIN CALCIUM 40 MG/1
40 TABLET, FILM COATED ORAL EVERY EVENING
Status: DISCONTINUED | OUTPATIENT
Start: 2025-01-18 | End: 2025-01-19

## 2025-01-18 RX ORDER — DEXTROSE MONOHYDRATE 25 G/50ML
25-50 INJECTION, SOLUTION INTRAVENOUS
Status: DISCONTINUED | OUTPATIENT
Start: 2025-01-18 | End: 2025-01-20 | Stop reason: HOSPADM

## 2025-01-18 RX ORDER — GADOBUTROL 604.72 MG/ML
6 INJECTION INTRAVENOUS ONCE
Status: COMPLETED | OUTPATIENT
Start: 2025-01-18 | End: 2025-01-18

## 2025-01-18 RX ORDER — ONDANSETRON 2 MG/ML
4 INJECTION INTRAMUSCULAR; INTRAVENOUS ONCE
Status: COMPLETED | OUTPATIENT
Start: 2025-01-18 | End: 2025-01-18

## 2025-01-18 RX ORDER — CLOPIDOGREL BISULFATE 75 MG/1
75 TABLET ORAL DAILY
Status: DISCONTINUED | OUTPATIENT
Start: 2025-01-19 | End: 2025-01-20 | Stop reason: HOSPADM

## 2025-01-18 RX ORDER — IOPAMIDOL 755 MG/ML
117 INJECTION, SOLUTION INTRAVASCULAR ONCE
Status: COMPLETED | OUTPATIENT
Start: 2025-01-18 | End: 2025-01-18

## 2025-01-18 RX ORDER — ASPIRIN 325 MG
325 TABLET ORAL ONCE
Status: COMPLETED | OUTPATIENT
Start: 2025-01-18 | End: 2025-01-18

## 2025-01-18 RX ORDER — HYDRALAZINE HYDROCHLORIDE 20 MG/ML
5 INJECTION INTRAMUSCULAR; INTRAVENOUS EVERY 6 HOURS PRN
Status: DISCONTINUED | OUTPATIENT
Start: 2025-01-18 | End: 2025-01-19

## 2025-01-18 RX ORDER — ONDANSETRON 2 MG/ML
4 INJECTION INTRAMUSCULAR; INTRAVENOUS EVERY 6 HOURS PRN
Status: DISCONTINUED | OUTPATIENT
Start: 2025-01-18 | End: 2025-01-20 | Stop reason: HOSPADM

## 2025-01-18 RX ORDER — ACETAMINOPHEN 325 MG/1
650 TABLET ORAL EVERY 4 HOURS PRN
Status: DISCONTINUED | OUTPATIENT
Start: 2025-01-18 | End: 2025-01-20 | Stop reason: HOSPADM

## 2025-01-18 RX ORDER — NICOTINE POLACRILEX 4 MG
15-30 LOZENGE BUCCAL
Status: DISCONTINUED | OUTPATIENT
Start: 2025-01-18 | End: 2025-01-20 | Stop reason: HOSPADM

## 2025-01-18 RX ORDER — ASPIRIN 81 MG/1
81 TABLET, CHEWABLE ORAL DAILY
Status: DISCONTINUED | OUTPATIENT
Start: 2025-01-19 | End: 2025-01-20 | Stop reason: HOSPADM

## 2025-01-18 RX ORDER — LIDOCAINE 40 MG/G
CREAM TOPICAL
Status: DISCONTINUED | OUTPATIENT
Start: 2025-01-18 | End: 2025-01-20 | Stop reason: HOSPADM

## 2025-01-18 RX ORDER — ACETAMINOPHEN 650 MG/1
650 SUPPOSITORY RECTAL EVERY 4 HOURS PRN
Status: DISCONTINUED | OUTPATIENT
Start: 2025-01-18 | End: 2025-01-20 | Stop reason: HOSPADM

## 2025-01-18 RX ORDER — MECLIZINE HYDROCHLORIDE 25 MG/1
25 TABLET ORAL ONCE
Status: COMPLETED | OUTPATIENT
Start: 2025-01-18 | End: 2025-01-18

## 2025-01-18 RX ORDER — LABETALOL HYDROCHLORIDE 5 MG/ML
10 INJECTION, SOLUTION INTRAVENOUS ONCE
Status: COMPLETED | OUTPATIENT
Start: 2025-01-18 | End: 2025-01-18

## 2025-01-18 RX ORDER — ONDANSETRON 4 MG/1
4 TABLET, ORALLY DISINTEGRATING ORAL EVERY 6 HOURS PRN
Status: DISCONTINUED | OUTPATIENT
Start: 2025-01-18 | End: 2025-01-20 | Stop reason: HOSPADM

## 2025-01-18 RX ORDER — LABETALOL HYDROCHLORIDE 5 MG/ML
5 INJECTION, SOLUTION INTRAVENOUS ONCE
Status: COMPLETED | OUTPATIENT
Start: 2025-01-18 | End: 2025-01-18

## 2025-01-18 RX ORDER — CLOPIDOGREL BISULFATE 75 MG/1
300 TABLET ORAL ONCE
Status: COMPLETED | OUTPATIENT
Start: 2025-01-18 | End: 2025-01-18

## 2025-01-18 RX ADMIN — ONDANSETRON 4 MG: 2 INJECTION INTRAMUSCULAR; INTRAVENOUS at 13:34

## 2025-01-18 RX ADMIN — LABETALOL HYDROCHLORIDE 5 MG: 5 INJECTION, SOLUTION INTRAVENOUS at 11:39

## 2025-01-18 RX ADMIN — ASPIRIN 325 MG ORAL TABLET 325 MG: 325 PILL ORAL at 10:53

## 2025-01-18 RX ADMIN — CLOPIDOGREL BISULFATE 300 MG: 75 TABLET ORAL at 17:20

## 2025-01-18 RX ADMIN — GADOBUTROL 6 ML: 604.72 INJECTION INTRAVENOUS at 12:53

## 2025-01-18 RX ADMIN — SODIUM CHLORIDE 100 ML: 9 INJECTION, SOLUTION INTRAVENOUS at 11:00

## 2025-01-18 RX ADMIN — IOPAMIDOL 117 ML: 755 INJECTION, SOLUTION INTRAVENOUS at 10:53

## 2025-01-18 RX ADMIN — HYDRALAZINE HYDROCHLORIDE 5 MG: 20 INJECTION INTRAMUSCULAR; INTRAVENOUS at 17:24

## 2025-01-18 RX ADMIN — LABETALOL HYDROCHLORIDE 10 MG: 5 INJECTION, SOLUTION INTRAVENOUS at 13:34

## 2025-01-18 RX ADMIN — MECLIZINE HYDROCHLORIDE 25 MG: 25 TABLET ORAL at 13:34

## 2025-01-18 RX ADMIN — ACETAMINOPHEN 650 MG: 325 TABLET ORAL at 21:19

## 2025-01-18 RX ADMIN — ATORVASTATIN CALCIUM 40 MG: 40 TABLET, FILM COATED ORAL at 21:20

## 2025-01-18 ASSESSMENT — ACTIVITIES OF DAILY LIVING (ADL)
ADLS_ACUITY_SCORE: 22
ADLS_ACUITY_SCORE: 22
ADLS_ACUITY_SCORE: 41
ADLS_ACUITY_SCORE: 41
ADLS_ACUITY_SCORE: 22
ADLS_ACUITY_SCORE: 42
ADLS_ACUITY_SCORE: 44
ADLS_ACUITY_SCORE: 22
ADLS_ACUITY_SCORE: 41
DEPENDENT_IADLS:: INDEPENDENT
ADLS_ACUITY_SCORE: 41
ADLS_ACUITY_SCORE: 44

## 2025-01-18 ASSESSMENT — ENCOUNTER SYMPTOMS
BACK PAIN: 1
HEADACHES: 0
SPEECH DIFFICULTY: 1
DIZZINESS: 1
WEAKNESS: 1
NUMBNESS: 1

## 2025-01-18 NOTE — ED TRIAGE NOTES
Pt went to bed at 1030 pm , woke at 0200 am to go to bathroom and fell when he found Lt leg/arm weakness, has had similar events in past months.  Now with slurred speech and Lt facial numbness/ altered sensation.

## 2025-01-18 NOTE — CONSULTS
[facility]    Stroke Telephone Note    I was called by Heather Khoury on 01/18/25 regarding patient Pérez Dumont. The patient is a 54 year old male with past medical history of hypertension and diabetes presented because of acute onset of left arm and left leg motor and sensory deficit.  Last known well was last night 10 PM when he went to bed, and around 2 AM early in the morning the patient experienced the symptoms.    Vitals                  Weight: 63.5 kg (140 lb) (pt stated)    Stroke Code Data (for stroke code without tele)  Stroke code activated 01/18/25  1041   Stroke provider first response 01/18/25  1046   Last known normal 01/17/25  2200      Time of discovery (or onset of symptoms) 01/18/25  0200   Head CT read by Stroke Neuro Provider 01/18/25  1057   Was stroke code de-escalated? Yes  01/18/25  1114     Imaging Findings  CT head: There is a hypodensity involving posterior aspect of the putamen near the internal capsule which could represent acute stroke  CTA head/neck: No large vessel occlusion  CT perfusion: There is no regional perfusion deficit    Intravenous Thrombolysis  Not given due to:   - unclear or unfavorable risk-benefit profile for extended window thrombolysis beyond the conventional 4.5 hour time window    Endovascular Treatment  Not initiated due to absence of proximal vessel occlusion    Impression  Acute onset of left arm and leg motor and sensory deficit possibly ischemic stroke    Recommendations   Stroke code de-escalated  Obtain MRI of the brain with and without contrast  If MRI is positive, contact stroke neurology, and admit the patient    My recommendations are based on the information provided over the phone by Pérez Dumont's in-person providers. They are not intended to replace the clinical judgment of his in-person providers. I was not requested to personally see or examine the patient at this time.     The Stroke Staff is Dr. Anguiano.    Jolene  "MD Gavin  Vascular Neurology Fellow    To page me or covering stroke neurology team member, click here: AMCOM  Choose \"On Call\" tab at top, then select \"NEUROLOGY/ALL SITES\" from middle drop-down box, press Enter, then look for \"stroke\" or \"telestroke\" for your site.    "

## 2025-01-18 NOTE — LETTER
[facility]  [address] [address]  Phone: [phone]  Fax: [phone]    January 20, 2025        Pérez Dumont  [facility] [facility] [facility] [address]          To whom it may concern:    RE: Pérez S Julia    The patient was admitted to [facility] 1/18/25 and discharged 1/20/25. He should not return to work for at least the next week until he follow up with his PCP.          Sincerely,    Julio Ceja D.O.

## 2025-01-18 NOTE — ED PROVIDER NOTES
EMERGENCY DEPARTMENT ENCOUNTER      NAME: Pérez Dumont  AGE: 54 year old male  YOB: 1970  MRN: [id number]  EVALUATION DATE & TIME: No admission date for patient encounter.    PCP: No Ref-Primary, Physician    ED PROVIDER: Heather Khoury DO      Chief Complaint   Patient presents with    Stroke Symptoms         FINAL IMPRESSION:  1. Acute CVA (cerebrovascular accident) (H)    2. Hypertension, unspecified type    3. Left-sided weakness          ED COURSE & MEDICAL DECISION MAKIN-year-old male was brought into the ED by his son for evaluation of left-sided weakness that the patient first noticed when he got up from sleep at 2 AM to go to the bathroom.  I was called out to triage to evaluate the patient for a stroke.  On my exam the patient was noted to have pronator drift involving the left arm and left leg.  He also had sensory discrepancies in the left arm, leg, and face.  He also had ataxia with the left arm as well as mild dysarthria.  The remainder of his physical exam was unremarkable.  Of note, the patient states that he had similar symptoms approximately 1 month ago that resolved spontaneously.  Following his initial evaluation a stroke code was called.    The patient's case was discussed with the stroke neurology provider.    Upon completion of the head CT scan the case was again discussed with the stroke neurologist who recommended de-escalation MRI for further evaluation.  The stroke code was de-escalated.    The CT and CTA results were discussed with the radiologist.  Head CT scan unremarkable.  CTA shows no large proximal vessel occlusions.  There were numerous areas of stenosis most concerning of which was in the distal right MCA.  The perfusion study was abnormal.    Because of persistent systolic pressures in the 220s the patient was given 5 mg of IV labetalol.  He was also given 325 mg of aspirin.    The patient's EKG revealed normal sinus rhythm with new ST segment depressions in  the inferior and lateral leads when compared to an EKG from 2020.     The patient's creatinine was 1.7.  CBC and BMP were otherwise unremarkable.  Initial troponin was 39.  The repeat troponin was 41.    Patient was noted to have persistently elevated blood pressures in the 220 to 260 range.  The patient was given a 10 mg dose of IV labetalol    MRI of the brain shows multifocal regions of restricted diffusion bilaterally.  These findings are likely embolic in etiology.     Patient reported nausea and vertigo-like symptoms after returning from arrived.  Patient was given IV Zofran and meclizine.    The patient was reevaluated and both he and his family was were informed of the MRI results.  The patient was were educated about the embolic strokes and the need for admission and further evaluation    The patient's case was discussed with the admitting hospitalist Dr. Gonsalves.    The patient's case was discussed with the stroke neurology team who recommended admission, aspirin, and a loading dose of Plavix.  They also recommended that the patient's systolic blood pressure remain less than 220.       Pertinent Labs & Imaging studies reviewed. (See chart for details)  10:35 AM I met with the patient to gather history and to perform my initial exam. We discussed plans for the ED course, including diagnostic testing and treatment.   11:26 AM Spoke with radiology.   1.53 PM Spoke with stroke neurology, Dr. Paz.   2:27 PM I discussed the case with hospitalist, Dr Gonsalves, who accepts the patient for admission.     At the conclusion of the encounter I discussed the results of all of the tests and the disposition. The questions were answered. The patient or family acknowledged understanding and was agreeable with the care plan.     Medical Decision Making    History:  Supplemental history from: Documented in chart and Family Member/Significant Other  External Record(s) reviewed: Documented in chart    Work Up:  Chart  documentation includes differential considered and any EKGs or imaging independently interpreted by provider, where specified.  In additional to work up documented, I considered the following work up: Documented in chart, if applicable.    External consultation:  Discussion of management with another provider: Documented in chart, if applicable    Complicating factors:  Care impacted by chronic illness: Diabetes  Care affected by social determinants of health: N/A    Disposition considerations: Admit.    Not Applicable      Critical Care  Performed by: Heather Khoury DO  Authorized by: Heather Khoury DO     Total critical care time: 40 minutes  Critical care time was exclusive of separately billable procedures and treating other patients.  Critical care was necessary to treat or prevent imminent or life-threatening deterioration of the following conditions: CVA  Critical care was time spent personally by me on the following activities: development of treatment plan with patient or surrogate, discussions with consultants, examination of patient, evaluation of patient's response to treatment, obtaining history from patient or surrogate, ordering and performing treatments and interventions, ordering and review of laboratory studies, ordering and review of radiographic studies and re-evaluation of patient's condition, this excludes any separately billable procedures.    PPE worn: n95 mask, goggles    MEDICATIONS GIVEN IN THE EMERGENCY:  Medications   clopidogrel (PLAVIX) tablet 300 mg (has no administration in time range)   iopamidol (ISOVUE-370) solution 117 mL (117 mLs Intravenous $Given 1/18/25 1053)     And   sodium chloride 0.9 % bag for CT scan flush use (100 mLs As instructed $Given 1/18/25 1100)   labetalol (NORMODYNE/TRANDATE) injection 5 mg (5 mg Intravenous $Given 1/18/25 1139)   aspirin (ASA) tablet 325 mg (325 mg Oral $Given 1/18/25 1053)   gadobutrol (GADAVIST) injection 6 mL (6 mLs Intravenous $Given 1/18/25  0849)   labetalol (NORMODYNE/TRANDATE) injection 10 mg (10 mg Intravenous $Given 1/18/25 1334)   ondansetron (ZOFRAN) injection 4 mg (4 mg Intravenous $Given 1/18/25 1334)   meclizine (ANTIVERT) tablet 25 mg (25 mg Oral $Given 1/18/25 1334)       NEW PRESCRIPTIONS STARTED AT TODAY'S ER VISIT  New Prescriptions    No medications on file          =================================================================    HPI    Patient information was obtained from: Patient and patient's son    Use of : N/A         éPrez Dumont is a 54 year old male with a pertinent history of type 2 diabetes mellitus, acute pancreatitis, acute cholecystitis, who presents to this ED via private car for evaluation of stroke symptoms. Patient went to bed last night around 10 PM feeling normal. Around 2 AM he woke up to use the bathroom and fell. After the fall, he felt a pinching sensation and dizziness (baseline he has vertigo), then onset of left sided weakness. No headache. States this has happened twice before in the last couple of months and each time symptoms subsided on their own. When this happens he notices he is unable to lift his left arm. Son notes left arm and leg edema as well as new slurred and heavy speech.       REVIEW OF SYSTEMS   Review of Systems   Musculoskeletal:  Positive for back pain.   Neurological:  Positive for dizziness, speech difficulty, weakness (left sided) and numbness (left more numb than right). Negative for headaches.       PAST MEDICAL HISTORY:  Past Medical History:   Diagnosis Date    Diabetes mellitus (H)     Hypertension        PAST SURGICAL HISTORY:  History reviewed. No pertinent surgical history.        CURRENT MEDICATIONS:    No current outpatient medications on file.      ALLERGIES:  No Known Allergies    FAMILY HISTORY:  Family History   Problem Relation Age of Onset    No Known Problems Mother     No Known Problems Father        SOCIAL HISTORY:   Social History     Socioeconomic  "History    Marital status:      Spouse name: None    Number of children: None    Years of education: None    Highest education level: None   Tobacco Use    Smoking status: Never    Smokeless tobacco: Never   Substance and Sexual Activity    Alcohol use: Not Currently       VITALS:  BP (!) 220/114   Pulse 80   Temp 97.1  F (36.2  C) (Axillary)   Resp 27   Ht 1.6 m (5' 3\")   Wt 65.3 kg (144 lb)   SpO2 97%   BMI 25.51 kg/m      PHYSICAL EXAM    General presentation: Alert, Vital signs reviewed. NAD  HENT: ENT inspection is normal. Oropharynx is moist and clear.   Eye: Pupils are equal and reactive to light. EOMI  Neck: The neck is supple, with full ROM, with no evidence of meningismus.  Pulmonary: Currently in no acute respiratory distress. Normal, non labored respirations, the lung sounds are normal with good equal air movement. Clear to auscultation bilaterally.   Circulatory: Regular rate and rhythm. Peripheral pulses are strong and equal. No murmurs, rubs, or gallops.   Abdominal: The abdomen is soft. Nontender. No rigidity, guarding, or rebound. Bowel sounds normal.   Neurologic: Alert, oriented to person, place, and time. No motor deficit. No sensory deficit. Cranial nerves II through XII are intact.  Musculoskeletal: No extremity tenderness. Full range of motion in all extremities. No extremity edema.   Skin: Skin color is normal. No rash. Warm. Dry to touch.     National [employer] Stroke Scale  Exam Interval: Baseline   Score    Level of consciousness: (0)   Alert, keenly responsive    LOC questions: (0)   Answers both questions correctly    LOC commands: (0)   Performs both tasks correctly    Best gaze: (0)   Normal    Visual: (0)   No visual loss    Facial palsy: (0)   Normal symmetrical movements    Motor arm (left): (1)   Drift    Motor arm (right): (0)   No drift    Motor leg (left): (1)   Drift    Motor leg (right): (0)   No drift    Limb ataxia: (1)   Present in one limb    " Sensory: (1)   Mild to moderate sensory loss    Best language: (0)   Normal- no aphasia    Dysarthria: (1)   Mild to moderate dysarthria    Extinction and inattention: (0)   No abnormality        Total Score:  5         LAB:  All pertinent labs reviewed and interpreted.  Results for orders placed or performed during the hospital encounter of 01/18/25   CTA Head Neck with Contrast    Impression    CONCLUSION:   HEAD CT:  1.  No evidence of acute hemorrhage, mass effect, or acute vascular territorial infarction. Consider MRI for further evaluation, as clinically appropriate.    HEAD CTA:   1.  No evidence of proximal large vessel occlusion.  2.  Critically stenotic versus segmentally occluded posterior right M3 segment. Diffuse contour irregularity and luminal narrowing of the right middle cerebral artery is compatible with underlying atherosclerosis.  3.  Moderate to severe proximal posterior left M2 segment stenosis. Moderate mid posterior M2 segment stenosis.  4.  Moderate left A3 segment stenosis.    NECK CTA:  1.  No evidence of hemodynamically significant stenosis based on NASCET criteria.  2.  No evidence for dissection.    Results discussed with Heather Khoury on 01/18/2025, 11:25 AM CST.      CT Head Perfusion w Contrast - For Tier 2 Stroke    Impression    IMPRESSION:   1.  No evidence of core infarction or regional hypoperfusion.    Results discussed with Dr. Khoury on 1/18/2025 11:25 AM CST.    MR Brain w/o & w Contrast    Impression    IMPRESSION:  1.  Multifocal regions of restricted diffusion in multiple vascular distributions as could be seen with an embolic process. Chronic changes as above.   Basic metabolic panel   Result Value Ref Range    Sodium 141 135 - 145 mmol/L    Potassium 3.4 3.4 - 5.3 mmol/L    Chloride 109 (H) 98 - 107 mmol/L    Carbon Dioxide (CO2) 22 22 - 29 mmol/L    Anion Gap 10 7 - 15 mmol/L    Urea Nitrogen 32.0 (H) 6.0 - 20.0 mg/dL    Creatinine 1.70 (H) 0.67 - 1.17 mg/dL    GFR  Estimate 47 (L) >60 mL/min/1.73m2    Calcium 8.7 (L) 8.8 - 10.4 mg/dL    Glucose 197 (H) 70 - 99 mg/dL   Result Value Ref Range    INR 0.91 0.85 - 1.15   Partial thromboplastin time   Result Value Ref Range    aPTT 25 22 - 38 Seconds   Result Value Ref Range    Troponin T, High Sensitivity 39 (H) <=22 ng/L   CBC with platelets and differential   Result Value Ref Range    WBC Count 5.7 4.0 - 11.0 10e3/uL    RBC Count 5.37 4.40 - 5.90 10e6/uL    Hemoglobin 14.8 13.3 - 17.7 g/dL    Hematocrit 43.3 40.0 - 53.0 %    MCV 81 78 - 100 fL    MCH 27.6 26.5 - 33.0 pg    MCHC 34.2 31.5 - 36.5 g/dL    RDW 11.9 10.0 - 15.0 %    Platelet Count 278 150 - 450 10e3/uL    % Neutrophils 60 %    % Lymphocytes 31 %    % Monocytes 6 %    % Eosinophils 1 %    % Basophils 1 %    % Immature Granulocytes 0 %    NRBCs per 100 WBC 0 <1 /100    Absolute Neutrophils 3.5 1.6 - 8.3 10e3/uL    Absolute Lymphocytes 1.8 0.8 - 5.3 10e3/uL    Absolute Monocytes 0.4 0.0 - 1.3 10e3/uL    Absolute Eosinophils 0.1 0.0 - 0.7 10e3/uL    Absolute Basophils 0.1 0.0 - 0.2 10e3/uL    Absolute Immature Granulocytes 0.0 <=0.4 10e3/uL    Absolute NRBCs 0.0 10e3/uL   Glucose by meter   Result Value Ref Range    GLUCOSE BY METER POCT 159 (H) 70 - 99 mg/dL   Result Value Ref Range    Troponin T, High Sensitivity 41 (H) <=22 ng/L       RADIOLOGY:  Reviewed all pertinent imaging. Please see official radiology report.  MR Brain w/o & w Contrast   Final Result   IMPRESSION:   1.  Multifocal regions of restricted diffusion in multiple vascular distributions as could be seen with an embolic process. Chronic changes as above.      CT Head Perfusion w Contrast - For Tier 2 Stroke   Final Result   IMPRESSION:    1.  No evidence of core infarction or regional hypoperfusion.      Results discussed with Dr. Khoury on 1/18/2025 11:25 AM CST.       CTA Head Neck with Contrast   Final Result   CONCLUSION:    HEAD CT:   1.  No evidence of acute hemorrhage, mass effect, or acute vascular  territorial infarction. Consider MRI for further evaluation, as clinically appropriate.      HEAD CTA:    1.  No evidence of proximal large vessel occlusion.   2.  Critically stenotic versus segmentally occluded posterior right M3 segment. Diffuse contour irregularity and luminal narrowing of the right middle cerebral artery is compatible with underlying atherosclerosis.   3.  Moderate to severe proximal posterior left M2 segment stenosis. Moderate mid posterior M2 segment stenosis.   4.  Moderate left A3 segment stenosis.      NECK CTA:   1.  No evidence of hemodynamically significant stenosis based on NASCET criteria.   2.  No evidence for dissection.      Results discussed with Heather Khoury on 01/18/2025, 11:25 AM CST.              EKG:    Normal sinus rhythm.  Rate of 82.  Normal QRS.  Normal QT.  ST depressions noted in the inferior and lateral leads.  Compared to EKG on 1/20/2020 the ST segment depressions appear new.    I have independently reviewed and interpreted the EKG(s) documented above.        I, Kathleen Craven , am serving as a scribe to document services personally performed by Heather Khoury DO based on my observation and the provider's statements to me. I, Heather Khoury, attest that Kathleen Craven is acting in a scribe capacity, has observed my performance of the services and has documented them in accordance with my direction.    Heather Khoury DO  Emergency Medicine  [facility] EMERGENCY DEPARTMENT  [address] [place] [id number]  [phone]       Heather Khoury DO  01/18/25 2557

## 2025-01-18 NOTE — PROGRESS NOTES
"MRI shows multifocal bilateral MCA territory ischemic infarcts.  Patient does not have any known history of atrial fibrillation and is 54 years old.  He came in with left arm and leg weakness/numbness.  CTA did show critical stenosis versus occlusion right M3, moderate-severe L M2 stenosis and moderate L A3 stenosis- appears to be atherosclerotic etiology.      Impression:  Multifocal bilateral MCA territory ischemic infarcts, suspect most likely embolic strokes of undetermined source (ESUS) in the young, less likely but possibly due to his intracranial atherosclerotic disease (ICAD) but lower suspicion of this given BP has been quite elevated (no obvious hypotensive episodes that would have led to hypoperfusion today)    Recommendations:  - Use orderset: \"Ischemic Stroke Routine Admission\" or \"Ischemic Stroke No Thrombolytics/No Thrombectomy ICU Admission\"  - [facility] Neurology  Stroke Consult order   -goal SBP <220 x first 24 hours post-stroke, avoid hypotension or decreasing BP much lower than this goal for now  -Neuro checks and vitals every 4 hours  -recommend blood cultures and if TTE (with bubble study) unrevealing then would also recommend SCOTTY  -MRI brain w/wo contrast   - mg daily  - Plavix 300 mg x 1 followed by 75 mg daily x 90 days  -recommend CT chest/abdomen/pelvis  -PT/OT/SPT, Dysphagia screen  -troponin x 3, Telemetry, EKG, 14 day Ziopatch at discharge to look for Afib   -blood glucose monitoring, check Hgb A1c (goal <7%)  -Start Lipitor 80 mg daily, check Lipid panel (titrate to goal LDL 40-70), <40 increases risk of ICH  -Euthermia, euglycemia, eunatremia   -Stroke Education    Discussed with vascular neurology attending, Dr. Brandi Panchal PA-C  Vascular Neurology    To page me or covering stroke neurology team member, click here: AMCOM  Choose \"On Call\" tab at top, then select \"NEUROLOGY/ALL SITES\" from middle drop-down box, press Enter, then look for \"stroke\" " "or \"telestroke\" for your site.   "

## 2025-01-18 NOTE — PHARMACY-ADMISSION MEDICATION HISTORY
Pharmacy Intern Admission Medication History    Admission medication history is complete. The information provided in this note is only as accurate as the sources available at the time of the update.    Information Source(s): Patient, Family member, and CareEverywhere/SureScripts via in-person    Pertinent Information: Family members present in room and helped with history. Patient reports he has not taken any medications nor seen a PCP ever since his PCP retired over 1 year ago. He is not taking any Rx or OTC medications recently nor currently.     Changes made to PTA medication list:  Added: None  Deleted: (all last filled 2021)- atorvastatin, glipizide, Norco, Losartan, Metformin  Changed: None    Allergies reviewed with patient and updates made in EHR: yes    Medication History Completed By: Andrés JOY 1/18/2025 1:16 PM    No outpatient medications have been marked as taking for the 1/18/25 encounter (Hospital Encounter).

## 2025-01-18 NOTE — CONSULTS
NEUROLOGY INPATIENT CONSULTATION NOTE       [facility]  [address] [place] [address]  Tel: [phone]  Fax: [phone]  [email or web address]     Pérez Dumont,  1970, MRN [id number]  PCP: No Ref-Primary, Physician  Date: 2025     ASSESSMENT & PLAN     Diagnosis code: Bilateral embolic infarction    Bilateral embolic infarction  54 years old male with DM2, osteoarthritis, HTN who presented with acute onset of left-sided weakness along with speech difficulty.  CT of the head and CTA head and neck shows hypodensity in putamen.  Diffuse intracranial atherosclerosis was noted in the right M3, left M2, left A3.  Internal carotid arteries are patent  MRI brain showed multifocal areas of restricted diffusion in multiple vascular territories in periventricular distribution bilaterally as well as involving the bilateral basal ganglia and right parietal lobe.  Bilateral ischemia suggest embolic event  Dual antiplatelet therapy with baby aspirin and Plavix for 90 days afterwards switch to enteric-coated aspirin unless workup suggests atrial fibrillation  Echocardiogram with bubble study  Telemetry monitoring and if no cardiac arrhythmia scheduled for outpatient 30-day event monitor  Check lipid panel, hemoglobin A1c  Physical, occupational, speech therapy  Stroke education was provided    Thank you again for this referral, please feel free to contact me if you have any questions.    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 Khoury to evaluate Pérez Dumont who is a 54 year old  male for bilateral CVA    Patient is a 54 years old male with history of DM 2, osteoarthritis, HTN who presented to the emergency room with acute onset of left arm and leg weakness.  He had a similar episode in past months.  In  "addition to left-sided weakness he had slurred speech.  His last known well was 10 PM last night and 2 AM in the morning he experienced the symptoms.  He presented to the emergency room at 10:41 AM.  A stroke code was called and had a CT of the head and CTA that showed a hypodensity in the putamen.  CTA showed diffuse intracranial atherosclerosis including right M3, left M2 and left A3.  No stenosis in carotids noted.  Subsequently patient had a MRI of the brain that showed multifocal areas of restricted diffusion in multiple vascular territories in the periventricular distribution bilaterally as well as involving the bilateral basal ganglia and in the right cortical parietal lobe.     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    Weight as of this encounter: 65.3 kg (144 lb).           # Financial/Environmental Concerns: none           PAST MEDICAL & SURGICAL HISTORY     Past Medical History: Patient  has a past medical history of Diabetes mellitus (H) and Hypertension.    Past Surgical History: He  has no past surgical history on file.     SOCIAL HISTORY     Reviewed, and he  reports that he has never smoked. He has never used smokeless " tobacco. He reports that he does not currently use alcohol.     FAMILY HISTORY     Reviewed, and family history includes No Known Problems in his father and mother.     ALLERGIES     No Known Allergies     REVIEW OF SYSTEMS     Pertinent items are noted in HPI.     HOME & HOSPITAL MEDICATIONS     Prior to Admission Medications  No medications prior to admission.       Hospital Medications  Current Facility-Administered Medications   Medication Dose Route Frequency Provider Last Rate Last Admin    [START ON 1/19/2025] aspirin (ASA) chewable tablet 81 mg  81 mg Oral Daily Leobardo Chin MD        atorvastatin (LIPITOR) tablet 40 mg  40 mg Oral QPM Leobardo Chin MD        clopidogrel (PLAVIX) tablet 300 mg  300 mg Oral Once Heather Khoury, DO        [START ON 1/19/2025] clopidogrel (PLAVIX) tablet 75 mg  75 mg Oral Daily Leobardo Chin MD            PHYSICAL EXAM     Vital signs  Temp:  [97.1  F (36.2  C)-98.1  F (36.7  C)] 98.1  F (36.7  C)  Pulse:  [79-84] 84  Resp:  [15-31] 19  BP: (201-262)/() 226/116  SpO2:  [96 %-99 %] 96 %    General Physical Exam: Patient is alert and oriented x 3. Vital signs were reviewed and are documented in EMR. Neck was supple, no carotid bruit, thyromegaly, JVD or lymphadenopathy noted.  Neurological Exam:  Patient is alert oriented x 3 no acute distress.  Speech normal with no dysarthria or aphasia.  Cranial nerves II through XII are intact.  Motor strength on the right 5 -/5 on the left 4/5 patient has dysmetria with finger-nose testing toes bilaterally upgoing     DIAGNOSTIC STUDIES     Pertinent Radiology   Radiology Results: Reviewed impression and images     CT  HEAD CT:  1.  No evidence of acute hemorrhage, mass effect, or acute vascular territorial infarction. Consider MRI for further evaluation, as clinically appropriate.     HEAD CTA:   1.  No evidence of proximal large vessel occlusion.  2.  Critically stenotic versus segmentally occluded posterior right M3 segment.  Diffuse contour irregularity and luminal narrowing of the right middle cerebral artery is compatible with underlying atherosclerosis.  3.  Moderate to severe proximal posterior left M2 segment stenosis. Moderate mid posterior M2 segment stenosis.  4.  Moderate left A3 segment stenosis.     NECK CTA:  1.  No evidence of hemodynamically significant stenosis based on NASCET criteria.  2.  No evidence for dissection.    CT PERFUSION  No evidence of core infarction or regional hypoperfusion.     MRI  Multifocal regions of restricted diffusion in multiple vascular distributions as could be seen with an embolic process. Chronic changes as above.     Pertinent Labs   Lab Results: Personally Reviewed   Recent Results (from the past 24 hours)   Basic metabolic panel    Collection Time: 01/18/25 11:08 AM   Result Value Ref Range    Sodium 141 135 - 145 mmol/L    Potassium 3.4 3.4 - 5.3 mmol/L    Chloride 109 (H) 98 - 107 mmol/L    Carbon Dioxide (CO2) 22 22 - 29 mmol/L    Anion Gap 10 7 - 15 mmol/L    Urea Nitrogen 32.0 (H) 6.0 - 20.0 mg/dL    Creatinine 1.70 (H) 0.67 - 1.17 mg/dL    GFR Estimate 47 (L) >60 mL/min/1.73m2    Calcium 8.7 (L) 8.8 - 10.4 mg/dL    Glucose 197 (H) 70 - 99 mg/dL   INR    Collection Time: 01/18/25 11:08 AM   Result Value Ref Range    INR 0.91 0.85 - 1.15   Partial thromboplastin time    Collection Time: 01/18/25 11:08 AM   Result Value Ref Range    aPTT 25 22 - 38 Seconds   Troponin T, High Sensitivity    Collection Time: 01/18/25 11:08 AM   Result Value Ref Range    Troponin T, High Sensitivity 39 (H) <=22 ng/L   CBC with platelets and differential    Collection Time: 01/18/25 11:08 AM   Result Value Ref Range    WBC Count 5.7 4.0 - 11.0 10e3/uL    RBC Count 5.37 4.40 - 5.90 10e6/uL    Hemoglobin 14.8 13.3 - 17.7 g/dL    Hematocrit 43.3 40.0 - 53.0 %    MCV 81 78 - 100 fL    MCH 27.6 26.5 - 33.0 pg    MCHC 34.2 31.5 - 36.5 g/dL    RDW 11.9 10.0 - 15.0 %    Platelet Count 278 150 - 450 10e3/uL    %  Neutrophils 60 %    % Lymphocytes 31 %    % Monocytes 6 %    % Eosinophils 1 %    % Basophils 1 %    % Immature Granulocytes 0 %    NRBCs per 100 WBC 0 <1 /100    Absolute Neutrophils 3.5 1.6 - 8.3 10e3/uL    Absolute Lymphocytes 1.8 0.8 - 5.3 10e3/uL    Absolute Monocytes 0.4 0.0 - 1.3 10e3/uL    Absolute Eosinophils 0.1 0.0 - 0.7 10e3/uL    Absolute Basophils 0.1 0.0 - 0.2 10e3/uL    Absolute Immature Granulocytes 0.0 <=0.4 10e3/uL    Absolute NRBCs 0.0 10e3/uL   Hemoglobin A1c    Collection Time: 01/18/25 11:08 AM   Result Value Ref Range    Estimated Average Glucose 189 (H) <117 mg/dL    Hemoglobin A1C 8.2 (H) <5.7 %   Glucose by meter    Collection Time: 01/18/25 11:28 AM   Result Value Ref Range    GLUCOSE BY METER POCT 159 (H) 70 - 99 mg/dL   Troponin T, High Sensitivity    Collection Time: 01/18/25  1:22 PM   Result Value Ref Range    Troponin T, High Sensitivity 41 (H) <=22 ng/L   Lipid Profile    Collection Time: 01/18/25  1:22 PM   Result Value Ref Range    Cholesterol 389 (H) <200 mg/dL    Triglycerides 234 (H) <150 mg/dL    Direct Measure HDL 47 >=40 mg/dL    LDL Cholesterol Calculated 295 (H) <100 mg/dL    Non HDL Cholesterol 342 (H) <130 mg/dL       Total time spent for face to face visit, reviewing labs/imaging studies, counseling and coordination of care was: 1 Hour 30 Minutes More than 50% of this time was spent on counseling and coordination of care.    This note was dictated using voice recognition software.  Any grammatical or context distortions are unintentional and inherent to the software.

## 2025-01-18 NOTE — H&P
"ADMISSION HISTORY & PHYSICAL      No Ref-Primary, Physician, None  ASSESSMENT AND PLAN:  54 year old male with history of type 2 diabetes, hypertension and osteoarthritis not on medication presenting with left-sided weakness     Ischemic stroke: MRI showing multifocal areas of restricted diffusion in multiple vascular territories in periventricular distribution bilaterally as well as involving the bilateral basal ganglia and right parietal lobe suggesting embolic event.  No carotid stenosis seen on imaging.  Seen by neurology, started on aspirin and Plavix.  Echo ordered.  Stroke order set, monitor on telemetry and if no cardiac arrhythmia schedule outpatient 30-day event monitor.  He still feels weak on his left side but feels it is improved.  History of type 2 diabetes: Not on medication, A1c is 8.2.  Start diabetic order set tonight and consider discharging on diabetic medication.  Hypertension: Currently not on medication, blood pressures have been extremely high in the ED.  Will order as needed hydralazine to keep systolic less than 220 and diastolic less than 120.  May need long-term antihypertensive therapy  Clinically Significant Risk Factors Present on Admission          # Hyperchloremia: Highest Cl = 109 mmol/L in last 2 days, will monitor as appropriate              # Hypertension: Noted on problem list          # DMII: A1C = 8.2 % (Ref range: <5.7 %) within past 6 months    # Overweight: Estimated body mass index is 25.51 kg/m  as calculated from the following:    Height as of this encounter: 1.6 m (5' 3\").    Weight as of this encounter: 65.3 kg (144 lb).       # Financial/Environmental Concerns: none          Barriers to discharge: Stroke workup      CHIEF COMPLAINT:  Left arm and leg weakness    HISTORY OF PRESENTING ILLNESS:  Pérez Dumont is a 54 year old male presenting with left arm and leg weakness which started around 2 AM this morning.  He presented outside the window for thrombolytics.  He " states that he had a similar episode a few months ago which spontaneously and rapidly resolved.  Currently he feels improved with arm and leg weakness.  No headache, no fevers or chills, no nausea or vomiting.    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  Current Facility-Administered Medications   Medication Dose Route Frequency Provider Last Rate Last Admin    [START ON 1/19/2025] aspirin (ASA) chewable tablet 81 mg  81 mg Oral Daily Leobardo Chin MD        atorvastatin (LIPITOR) tablet 40 mg  40 mg Oral QPM Leobardo Chin MD        [START ON 1/19/2025] clopidogrel (PLAVIX) tablet 75 mg  75 mg Oral Daily Leobardo Chin MD        hydrALAZINE (APRESOLINE) injection 5 mg  5 mg Intravenous Q6H PRN Reinaldo Gonsalves MD   5 mg at 01/18/25 1724       SOCIAL HISTORY:  Social History     Socioeconomic History    Marital status:      Spouse name: Not on file    Number of children: Not on file    Years of education: Not on file    Highest education level: Not on file   Occupational History    Not on file   Tobacco Use    Smoking status: Never    Smokeless tobacco: Never   Substance and Sexual Activity    Alcohol use: Not Currently    Drug use: Not on file    Sexual activity: Not on file   Other Topics Concern    Not on file   Social History Narrative    Not on file     Social Drivers of Health     Financial Resource Strain: Not on file   Food Insecurity: Not on file   Transportation Needs: Not on file   Physical Activity: Not on file   Stress: Not on file   Social Connections: Not on file  "  Interpersonal Safety: Not on file   Housing Stability: Not on file       FAMILY HISTORY:  Family History   Problem Relation Age of Onset    No Known Problems Mother     No Known Problems Father        ROS:  12 point ROS was performed and found to be negative except for the pertinent positives mentioned in the HPI.      PHYSICAL EXAM:  BP (!) 212/102 (BP Location: Right arm)   Pulse 84   Temp 98.1  F (36.7  C) (Oral)   Resp 19   Ht 1.6 m (5' 3\")   Wt 65.3 kg (144 lb)   SpO2 96%   BMI 25.51 kg/m    No intake/output data recorded.  No intake/output data recorded.  CONSTITUTIONAL: No apparent distress  HEENT:       Sclera- anicteric      Mucous membrane- moist and pink  LUNGS: Clear to auscultation bilaterally  CARDIOVASCULAR: S1S2 regular. No murmurs, rubs or gallops,no pedal edema  GI: Soft. Non-tender, non-distended. No organomegaly. No guarding or rigidity. Bowel sounds- active  NEURO: Cranial nerves II-XII grossly intact.  Left arm and leg weakness no involuntary movements  INTGM: No skin rash, no cyanosis or clubbing  LYMPH:  MSK: no joint swelling or tenderness  PSYCH: alert and oriented x 3, no agitation      DIAGNOSTIC DATA:  Recent Results (from the past 24 hours)   Basic metabolic panel    Collection Time: 01/18/25 11:08 AM   Result Value Ref Range    Sodium 141 135 - 145 mmol/L    Potassium 3.4 3.4 - 5.3 mmol/L    Chloride 109 (H) 98 - 107 mmol/L    Carbon Dioxide (CO2) 22 22 - 29 mmol/L    Anion Gap 10 7 - 15 mmol/L    Urea Nitrogen 32.0 (H) 6.0 - 20.0 mg/dL    Creatinine 1.70 (H) 0.67 - 1.17 mg/dL    GFR Estimate 47 (L) >60 mL/min/1.73m2    Calcium 8.7 (L) 8.8 - 10.4 mg/dL    Glucose 197 (H) 70 - 99 mg/dL   INR    Collection Time: 01/18/25 11:08 AM   Result Value Ref Range    INR 0.91 0.85 - 1.15   Partial thromboplastin time    Collection Time: 01/18/25 11:08 AM   Result Value Ref Range    aPTT 25 22 - 38 Seconds   Troponin T, High Sensitivity    Collection Time: 01/18/25 11:08 AM   Result Value " Ref Range    Troponin T, High Sensitivity 39 (H) <=22 ng/L   CBC with platelets and differential    Collection Time: 01/18/25 11:08 AM   Result Value Ref Range    WBC Count 5.7 4.0 - 11.0 10e3/uL    RBC Count 5.37 4.40 - 5.90 10e6/uL    Hemoglobin 14.8 13.3 - 17.7 g/dL    Hematocrit 43.3 40.0 - 53.0 %    MCV 81 78 - 100 fL    MCH 27.6 26.5 - 33.0 pg    MCHC 34.2 31.5 - 36.5 g/dL    RDW 11.9 10.0 - 15.0 %    Platelet Count 278 150 - 450 10e3/uL    % Neutrophils 60 %    % Lymphocytes 31 %    % Monocytes 6 %    % Eosinophils 1 %    % Basophils 1 %    % Immature Granulocytes 0 %    NRBCs per 100 WBC 0 <1 /100    Absolute Neutrophils 3.5 1.6 - 8.3 10e3/uL    Absolute Lymphocytes 1.8 0.8 - 5.3 10e3/uL    Absolute Monocytes 0.4 0.0 - 1.3 10e3/uL    Absolute Eosinophils 0.1 0.0 - 0.7 10e3/uL    Absolute Basophils 0.1 0.0 - 0.2 10e3/uL    Absolute Immature Granulocytes 0.0 <=0.4 10e3/uL    Absolute NRBCs 0.0 10e3/uL   Hemoglobin A1c    Collection Time: 01/18/25 11:08 AM   Result Value Ref Range    Estimated Average Glucose 189 (H) <117 mg/dL    Hemoglobin A1C 8.2 (H) <5.7 %   Glucose by meter    Collection Time: 01/18/25 11:28 AM   Result Value Ref Range    GLUCOSE BY METER POCT 159 (H) 70 - 99 mg/dL   Troponin T, High Sensitivity    Collection Time: 01/18/25  1:22 PM   Result Value Ref Range    Troponin T, High Sensitivity 41 (H) <=22 ng/L   Lipid Profile    Collection Time: 01/18/25  1:22 PM   Result Value Ref Range    Cholesterol 389 (H) <200 mg/dL    Triglycerides 234 (H) <150 mg/dL    Direct Measure HDL 47 >=40 mg/dL    LDL Cholesterol Calculated 295 (H) <100 mg/dL    Non HDL Cholesterol 342 (H) <130 mg/dL     All lab studies reviewed personally    MR Brain w/o & w Contrast    Result Date: 1/18/2025  EXAM: MR BRAIN W/O and W CONTRAST LOCATION: [facility] DATE: 1/18/2025 INDICATION: Left arm and leg weakness and numbness and dysarthria COMPARISON: 1/18/2025 CONTRAST: 6ml Gadavist TECHNIQUE:  Routine multiplanar multisequence head MRI without and with intravenous contrast. FINDINGS: INTRACRANIAL CONTENTS: Multifocal regions of restricted diffusion in a periventricular distribution bilaterally as well as involving the bilateral basal ganglia. Smaller foci of restricted diffusion in the right parietal lobe, cortically based.  No mass, acute hemorrhage, or extra-axial fluid collections. Mild global cortical volume loss with expected dilatation of the ventricular system. Mild-moderate chronic small vessel ischemic changes. Mild generalized cerebral atrophy. No hydrocephalus. Normal position of the cerebellar tonsils. No pathologic contrast enhancement. SELLA: No abnormality accounting for technique. OSSEOUS STRUCTURES/SOFT TISSUES: Normal marrow signal. The major intracranial vascular flow voids are maintained. ORBITS: No abnormality accounting for technique. SINUSES/MASTOIDS: No paranasal sinus mucosal disease. No middle ear or mastoid effusion.     IMPRESSION: 1.  Multifocal regions of restricted diffusion in multiple vascular distributions as could be seen with an embolic process. Chronic changes as above.    CTA Head Neck with Contrast    Result Date: 1/18/2025  [facility] CTA HEAD NECK W CONTRAST 01/18/2025, 10:58 AM CST INDICATION: Left facial droop and arm weakness, slurred speech. TECHNIQUE: Head and neck CT angiogram with IV contrast. Noncontrast head CT followed by axial helical CT images of the head and neck vessels obtained during the arterial phase of intravenous contrast administration. Axial helical 2D reconstructed images and multiplanar 3D MIP reconstructed images of the head and neck vessels were performed by the technologist. Dose reduction techniques were used. CONTRAST: 67 mL Isovue-370. COMPARISON: None available. FINDINGS: NONCONTRAST HEAD CT: INTRACRANIAL CONTENTS: No intracranial hemorrhage, extra-axial collection, or mass effect.  No CT evidence of  acute infarct. Mild presumed chronic small vessel ischemic changes. Age-appropriate ventricles and sulci. The sella is unremarkable for technique. The cerebellar tonsils are appropriately positioned. VISUALIZED ORBITS/SINUSES/MASTOIDS: No intraorbital abnormality. No paranasal sinus mucosal disease. No middle ear or mastoid effusion. BONES/SOFT TISSUES: No scalp hematoma. No skull fracture. HEAD CTA: ANTERIOR CIRCULATION: The intracranial internal carotid arteries appear patent. There is a moderate left A3 segment stenosis. The right anterior cerebral artery is unremarkable. The right middle cerebral artery demonstrates diffuse contour irregularity and luminal narrowing, compatible with underlying atherosclerosis. There is a critically stenotic versus segmentally occluded posterior right M3 segment stenosis. There is a moderate-to-severe proximal stenosis of the posterior left M2 segment. There is a moderate left mid posterior M2 segment stenosis. No aneurysm or high-flow vascular malformation is demonstrated. POSTERIOR CIRCULATION: The intradural vertebral, basilar and visualized proximal cerebellar arteries appear patent. There is a severe right P1/P2 junction stenosis and a subsequent severe proximal right P2 segment stenosis. There is a severe proximal left P3 segment stenosis. No aneurysm or high-flow vascular malformation is demonstrated. DURAL VENOUS SINUSES: Expected enhancement of the major dural venous sinuses. NECK CTA: RIGHT CAROTID: No measurable stenosis in the right ICA based on NASCET criteria. LEFT CAROTID: No measurable stenosis in the left ICA based on NASCET criteria. VERTEBRAL ARTERIES: Balanced vertebral arteries are patent in the neck and into the head. AORTIC ARCH: There is a left-sided aortic arch. No flow-limiting stenosis is apparent at the origins of the brachiocephalic, common carotid, subclavian or vertebral arteries. MISCELLANEOUS: The visualized lung apices are well aerated. Cervical  spine appears grossly intact, with mild degenerative change. The soft tissues of the neck, including the thyroid and parotid glands, are grossly unremarkable for technique.     CONCLUSION: HEAD CT: 1.  No evidence of acute hemorrhage, mass effect, or acute vascular territorial infarction. Consider MRI for further evaluation, as clinically appropriate. HEAD CTA: 1.  No evidence of proximal large vessel occlusion. 2.  Critically stenotic versus segmentally occluded posterior right M3 segment. Diffuse contour irregularity and luminal narrowing of the right middle cerebral artery is compatible with underlying atherosclerosis. 3.  Moderate to severe proximal posterior left M2 segment stenosis. Moderate mid posterior M2 segment stenosis. 4.  Moderate left A3 segment stenosis. NECK CTA: 1.  No evidence of hemodynamically significant stenosis based on NASCET criteria. 2.  No evidence for dissection. Results discussed with Heather Khoury on 01/18/2025, 11:25 AM CST.     CT Head Perfusion w Contrast - For Tier 2 Stroke    Result Date: 1/18/2025  EXAM: CT HEAD PERFUSION WITH CONTRAST LOCATION: [facility] DATE: 01/18/2025 INDICATION:  Left facial droop and arm weakness, slurred speech. COMPARISON: CT/CTA 01/18/2025. TECHNIQUE: CT cerebral perfusion was performed utilizing a contrast bolus. Perfusion data were post processed with generation of standard perfusion maps and estimation of ischemic/infarcted volumes utilizing standard threshold values. Dose reduction techniques were used. All stenosis measurements made according to NASCET criteria unless otherwise specified. CONTRAST: 50 mL Isovue-370. FINDINGS: TOTAL HYPOPERFUSION: Using the threshold of Tmax greater than 6 seconds, no regional hypoperfusion is demonstrated. CORE INFARCTION: Using the threshold of CBF less than 30%, no core infarction is demonstrated. PENUMBRA: The penumbra volume (mismatch volume) is 0 mL.     IMPRESSION: 1.  No evidence  of core infarction or regional hypoperfusion. Results discussed with Dr. Khoury on 1/18/2025 11:25 AM CST.     Radiology report reviewed.    Medically Ready for Discharge: Anticipated in 2-4 Days       Medical Decision Making       80 MINUTES SPENT BY ME on the date of service doing chart review, history, exam, documentation & further activities per the note.      Chidi Gonsalves MD  [facility] Medicine [facility]

## 2025-01-19 ENCOUNTER — APPOINTMENT (OUTPATIENT)
Dept: CARDIOLOGY | Facility: HOSPITAL | Age: 55
DRG: 065 | End: 2025-01-19
Attending: PSYCHIATRY & NEUROLOGY
Payer: COMMERCIAL

## 2025-01-19 ENCOUNTER — APPOINTMENT (OUTPATIENT)
Dept: PHYSICAL THERAPY | Facility: HOSPITAL | Age: 55
DRG: 065 | End: 2025-01-19
Attending: EMERGENCY MEDICINE
Payer: COMMERCIAL

## 2025-01-19 LAB
ANION GAP SERPL CALCULATED.3IONS-SCNC: 8 MMOL/L (ref 7–15)
BUN SERPL-MCNC: 31.9 MG/DL (ref 6–20)
CALCIUM SERPL-MCNC: 8.4 MG/DL (ref 8.8–10.4)
CHLORIDE SERPL-SCNC: 112 MMOL/L (ref 98–107)
CREAT SERPL-MCNC: 2.02 MG/DL (ref 0.67–1.17)
EGFRCR SERPLBLD CKD-EPI 2021: 38 ML/MIN/1.73M2
ERYTHROCYTE [DISTWIDTH] IN BLOOD BY AUTOMATED COUNT: 12.1 % (ref 10–15)
GLUCOSE BLDC GLUCOMTR-MCNC: 136 MG/DL (ref 70–99)
GLUCOSE BLDC GLUCOMTR-MCNC: 150 MG/DL (ref 70–99)
GLUCOSE BLDC GLUCOMTR-MCNC: 187 MG/DL (ref 70–99)
GLUCOSE BLDC GLUCOMTR-MCNC: 204 MG/DL (ref 70–99)
GLUCOSE BLDC GLUCOMTR-MCNC: 214 MG/DL (ref 70–99)
GLUCOSE BLDC GLUCOMTR-MCNC: 234 MG/DL (ref 70–99)
GLUCOSE BLDC GLUCOMTR-MCNC: 302 MG/DL (ref 70–99)
GLUCOSE SERPL-MCNC: 145 MG/DL (ref 70–99)
HCO3 SERPL-SCNC: 23 MMOL/L (ref 22–29)
HCT VFR BLD AUTO: 37.6 % (ref 40–53)
HGB BLD-MCNC: 13 G/DL (ref 13.3–17.7)
LVEF ECHO: NORMAL
MAGNESIUM SERPL-MCNC: 2 MG/DL (ref 1.7–2.3)
MCH RBC QN AUTO: 27.6 PG (ref 26.5–33)
MCHC RBC AUTO-ENTMCNC: 34.6 G/DL (ref 31.5–36.5)
MCV RBC AUTO: 80 FL (ref 78–100)
PLATELET # BLD AUTO: 253 10E3/UL (ref 150–450)
POTASSIUM SERPL-SCNC: 3.2 MMOL/L (ref 3.4–5.3)
POTASSIUM SERPL-SCNC: 3.7 MMOL/L (ref 3.4–5.3)
RBC # BLD AUTO: 4.71 10E6/UL (ref 4.4–5.9)
SODIUM SERPL-SCNC: 143 MMOL/L (ref 135–145)
WBC # BLD AUTO: 5.3 10E3/UL (ref 4–11)

## 2025-01-19 PROCEDURE — 93306 TTE W/DOPPLER COMPLETE: CPT | Mod: 26 | Performed by: INTERNAL MEDICINE

## 2025-01-19 PROCEDURE — 84132 ASSAY OF SERUM POTASSIUM: CPT | Performed by: INTERNAL MEDICINE

## 2025-01-19 PROCEDURE — 85048 AUTOMATED LEUKOCYTE COUNT: CPT | Performed by: EMERGENCY MEDICINE

## 2025-01-19 PROCEDURE — 83735 ASSAY OF MAGNESIUM: CPT | Performed by: INTERNAL MEDICINE

## 2025-01-19 PROCEDURE — 250N000013 HC RX MED GY IP 250 OP 250 PS 637: Performed by: INTERNAL MEDICINE

## 2025-01-19 PROCEDURE — 97162 PT EVAL MOD COMPLEX 30 MIN: CPT | Mod: GP | Performed by: PHYSICAL THERAPIST

## 2025-01-19 PROCEDURE — 255N000002 HC RX 255 OP 636: Performed by: PSYCHIATRY & NEUROLOGY

## 2025-01-19 PROCEDURE — 36415 COLL VENOUS BLD VENIPUNCTURE: CPT | Performed by: INTERNAL MEDICINE

## 2025-01-19 PROCEDURE — 36415 COLL VENOUS BLD VENIPUNCTURE: CPT | Performed by: EMERGENCY MEDICINE

## 2025-01-19 PROCEDURE — 99233 SBSQ HOSP IP/OBS HIGH 50: CPT | Performed by: INTERNAL MEDICINE

## 2025-01-19 PROCEDURE — 85014 HEMATOCRIT: CPT | Performed by: EMERGENCY MEDICINE

## 2025-01-19 PROCEDURE — 97110 THERAPEUTIC EXERCISES: CPT | Mod: GP | Performed by: PHYSICAL THERAPIST

## 2025-01-19 PROCEDURE — 120N000001 HC R&B MED SURG/OB

## 2025-01-19 PROCEDURE — 250N000012 HC RX MED GY IP 250 OP 636 PS 637: Performed by: INTERNAL MEDICINE

## 2025-01-19 PROCEDURE — 80048 BASIC METABOLIC PNL TOTAL CA: CPT | Performed by: EMERGENCY MEDICINE

## 2025-01-19 PROCEDURE — 250N000013 HC RX MED GY IP 250 OP 250 PS 637: Performed by: PSYCHIATRY & NEUROLOGY

## 2025-01-19 PROCEDURE — 99232 SBSQ HOSP IP/OBS MODERATE 35: CPT | Performed by: PSYCHIATRY & NEUROLOGY

## 2025-01-19 PROCEDURE — 250N000012 HC RX MED GY IP 250 OP 636 PS 637: Performed by: EMERGENCY MEDICINE

## 2025-01-19 PROCEDURE — 250N000013 HC RX MED GY IP 250 OP 250 PS 637: Performed by: EMERGENCY MEDICINE

## 2025-01-19 PROCEDURE — 250N000011 HC RX IP 250 OP 636: Performed by: INTERNAL MEDICINE

## 2025-01-19 PROCEDURE — 97116 GAIT TRAINING THERAPY: CPT | Mod: GP | Performed by: PHYSICAL THERAPIST

## 2025-01-19 RX ORDER — ATORVASTATIN CALCIUM 40 MG/1
80 TABLET, FILM COATED ORAL EVERY EVENING
Status: DISCONTINUED | OUTPATIENT
Start: 2025-01-19 | End: 2025-01-20 | Stop reason: HOSPADM

## 2025-01-19 RX ORDER — LOSARTAN POTASSIUM 25 MG/1
25 TABLET ORAL DAILY
Status: DISCONTINUED | OUTPATIENT
Start: 2025-01-19 | End: 2025-01-19

## 2025-01-19 RX ORDER — HYDRALAZINE HYDROCHLORIDE 20 MG/ML
10 INJECTION INTRAMUSCULAR; INTRAVENOUS EVERY 4 HOURS PRN
Status: DISCONTINUED | OUTPATIENT
Start: 2025-01-19 | End: 2025-01-20

## 2025-01-19 RX ORDER — POTASSIUM CHLORIDE 1500 MG/1
40 TABLET, EXTENDED RELEASE ORAL ONCE
Status: COMPLETED | OUTPATIENT
Start: 2025-01-19 | End: 2025-01-19

## 2025-01-19 RX ORDER — CLONIDINE HYDROCHLORIDE 0.1 MG/1
0.1 TABLET ORAL EVERY 8 HOURS PRN
Status: DISCONTINUED | OUTPATIENT
Start: 2025-01-19 | End: 2025-01-20 | Stop reason: HOSPADM

## 2025-01-19 RX ORDER — LOSARTAN POTASSIUM 25 MG/1
25 TABLET ORAL 2 TIMES DAILY
Status: DISCONTINUED | OUTPATIENT
Start: 2025-01-19 | End: 2025-01-20

## 2025-01-19 RX ADMIN — LOSARTAN POTASSIUM 25 MG: 25 TABLET, FILM COATED ORAL at 20:44

## 2025-01-19 RX ADMIN — HYDRALAZINE HYDROCHLORIDE 10 MG: 20 INJECTION INTRAMUSCULAR; INTRAVENOUS at 19:22

## 2025-01-19 RX ADMIN — CLONIDINE HYDROCHLORIDE 0.1 MG: 0.1 TABLET ORAL at 12:56

## 2025-01-19 RX ADMIN — ASPIRIN 81 MG CHEWABLE TABLET 81 MG: 81 TABLET CHEWABLE at 08:38

## 2025-01-19 RX ADMIN — PERFLUTREN 2 ML: 6.52 INJECTION, SUSPENSION INTRAVENOUS at 14:42

## 2025-01-19 RX ADMIN — HYDRALAZINE HYDROCHLORIDE 10 MG: 20 INJECTION INTRAMUSCULAR; INTRAVENOUS at 12:05

## 2025-01-19 RX ADMIN — ATORVASTATIN CALCIUM 80 MG: 40 TABLET, FILM COATED ORAL at 20:44

## 2025-01-19 RX ADMIN — ACETAMINOPHEN 650 MG: 325 TABLET ORAL at 20:45

## 2025-01-19 RX ADMIN — INSULIN ASPART 1 UNITS: 100 INJECTION, SOLUTION INTRAVENOUS; SUBCUTANEOUS at 07:28

## 2025-01-19 RX ADMIN — INSULIN GLARGINE 10 UNITS: 100 INJECTION, SOLUTION SUBCUTANEOUS at 20:47

## 2025-01-19 RX ADMIN — ACETAMINOPHEN 650 MG: 325 TABLET ORAL at 12:59

## 2025-01-19 RX ADMIN — LOSARTAN POTASSIUM 25 MG: 25 TABLET, FILM COATED ORAL at 10:18

## 2025-01-19 RX ADMIN — CLOPIDOGREL BISULFATE 75 MG: 75 TABLET ORAL at 08:38

## 2025-01-19 RX ADMIN — POTASSIUM CHLORIDE 40 MEQ: 1500 TABLET, EXTENDED RELEASE ORAL at 16:37

## 2025-01-19 ASSESSMENT — ACTIVITIES OF DAILY LIVING (ADL)
ADLS_ACUITY_SCORE: 22

## 2025-01-19 NOTE — PLAN OF CARE
Goal Outcome Evaluation:      Plan of Care Reviewed With: patient, spouse, child          Outcome Evaluation: Unknown at this time.

## 2025-01-19 NOTE — PROGRESS NOTES
"   01/19/25 0856   Appointment Info   Signing Clinician's Name / Credentials (PT) Clare Fraser, PT, DPT       Present no  (Pt and spouse decline .)   Living Environment   People in Home spouse;child(quinn), adult   Current Living Arrangements house   Home Accessibility stairs to enter home;stairs within home   Number of Stairs, Main Entrance 2   Number of Stairs, Within Home, Primary greater than 10 stairs   Stair Railings, Within Home, Primary railings safe and in good condition   Self-Care   Equipment Currently Used at Home none   Fall history within last six months no   Activity/Exercise/Self-Care Comment Pt independent with ADLs. Pt works full time as a fork .   General Information   Onset of Illness/Injury or Date of Surgery 01/18/25   Referring Physician Reinaldo Gonsalves MD   Patient/Family Therapy Goals Statement (PT) None stated.   Pertinent History of Current Problem (include personal factors and/or comorbidities that impact the POC) Per H&P: \"54 year old male presenting with left arm and leg weakness which started around 2 AM this morning.  He presented outside the window for thrombolytics.  He states that he had a similar episode a few months ago which spontaneously and rapidly resolved.  Currently he feels improved with arm and leg weakness.  No headache, no fevers or chills, no nausea or vomiting.\"   Existing Precautions/Restrictions fall   Range of Motion (ROM)   Range of Motion ROM is WFL   Strength (Manual Muscle Testing)   Strength Comments B UE and R LE strength WFL. L LE grossly demonstrates 4/5 strength.   Bed Mobility   Bed Mobility supine-sit   Supine-Sit [place] (Bed Mobility) supervision   Impairments Contributing to Impaired Bed Mobility decreased strength   Assistive Device (Bed Mobility) bed rails   Transfers   Transfers sit-stand transfer;toilet transfer   Impairments Contributing to Impaired Transfers decreased strength   Sit-Stand " Transfer   Sit-Stand [place] (Transfers) contact guard;verbal cues   Assistive Device (Sit-Stand Transfers) walker, front-wheeled   Toilet Transfer   [place] Level (Toilet Transfer) modified independence   Assistive Device (Toilet Transfer) walker, front-wheeled   Type (Toilet Transfer) stand-sit;sit-stand   Gait/Stairs (Locomotion)   [place] Level (Gait) contact guard;verbal cues   Assistive Device (Gait) walker, front-wheeled   Distance in Feet (Gait) 20'   Pattern (Gait) step-to   Deviations/Abnormal Patterns (Gait) davi decreased;gait speed decreased   Sensory Examination   Sensory Perception Comments Pt notes altered sensation on L LE.   Coordination   Coordination Comments Decreased coordination L LE with heel-to-shin. Alternating toe taps appears coordinated.   Clinical Impression   Criteria for Skilled Therapeutic Intervention Yes, treatment indicated   PT Diagnosis (PT) impaired functional mobility   Influenced by the following impairments decreased strength   Functional limitations due to impairments transfers, ambulation, stairs   Clinical Presentation (PT Evaluation Complexity) evolving   Clinical Presentation Rationale Clinical judgment.   Clinical Decision Making (Complexity) moderate complexity   Planned Therapy Interventions (PT) balance training;bed mobility training;gait training;home exercise program;neuromuscular re-education;patient/family education;strengthening;stair training;transfer training   Risk & Benefits of therapy have been explained evaluation/treatment results reviewed;participants voiced agreement with care plan;participants included;patient;spouse/significant other   PT Total Evaluation Time   PT Eval, Moderate Complexity Minutes (85040) 10   Physical Therapy Goals   PT Frequency Daily   PT Predicted Duration/Target Date for Goal Attainment 01/26/25   PT Goals Bed Mobility;Transfers;Gait;Stairs;PT Goal 1   PT: Bed Mobility Independent;Supine to/from sit   PT:  Transfers Supervision/stand-by assist;Sit to/from stand;Assistive device   PT: Gait Supervision/stand-by assist;Assistive device;Rolling walker;Greater than 200 feet   PT: Stairs Supervision/stand-by assist;Greater than 10 stairs;Rail on left   PT: Goal 1 Pt will demonstrate independence with HEP to improve L LE strength.   Interventions   Interventions Quick Adds Therapeutic Procedure;Gait Training   Therapeutic Procedure/Exercise   Ther. Procedure: strength, endurance, ROM, flexibillity Minutes (12417) 12   Symptoms Noted During/After Treatment fatigue   Treatment Detail/Skilled Intervention Pt instructed in standing ex for L LE strengthening including: hip abduction, hip flexion, hip extension and marching. Pt utilizes UE support on walker for support. Patient also takes several steps forward/back to work on L LE foot placement. Pt completes 10 reps of standing mini squats.   Gait Training   Gait Training Minutes (85523) 10   Symptoms Noted During/After Treatment (Gait Training) none   Treatment Detail/Skilled Intervention With FWW. Patient notes improved foot clearance on L LE with ambulation. Feels more comfortable using walker at this time. Recommended continued use. Patient ascends/descends 7 stairs with 2 rails, CGA. Educated pt and spouse on non-reciprocal squencing with stronger R LE.   Distance in Feet additional 100'   [place] Level (Gait Training) contact guard   Physical Assistance Level (Gait Training) verbal cues;1 person assist   Assistive Device (Gait Training) rolling walker   Gait Analysis Deviations decreased davi;decreased step length   Impairments (Gait Analysis/Training) balance impaired;strength decreased   Stair Railings present at both sides   Physical Assist/Nonphysical Assist (Stairs) verbal cues;1 person assist   Level of [place] (Stairs) contact guard   PT Discharge Planning   PT Plan stairs, standing ex for L LE strengthening, gait with FWW vs trial without device or with  cane   PT Discharge Recommendation (DC Rec) home with assist;home with outpatient physical therapy   PT Rationale for DC Rec Patient able to complete transfers and ambulation using AD with stand-by to contact guard assist. Recommend continued use of FWW at this time and outpatient PT to improve L LE strength.   PT Brief overview of current status Supine <> sit, SBA. Sit <> stand, CGA. Ambulates 120' with FWW, CGA. Ascends/descends 7 stairs with FWW, CGA.   PT Total Distance Amb During Session (feet) 120   PT Equipment Needed at Discharge walker, rolling  (will need at d/c)   Physical Therapy Time and Intention   Timed Code Treatment Minutes 22   Total Session Time (sum of timed and untimed services) 32

## 2025-01-19 NOTE — PROGRESS NOTES
NEUROLOGY INPATIENT PROGRESS NOTE       [facility] NEUROLOGY [place]  [address] [place] [address]  Tel: [phone]  Fax: [phone]  [email or web address]     ASSESSMENT & PLAN   Date: 01/19/2025  Hospital Day#: 1  Visit diagnosis: Bilateral embolic infarction    Bilateral embolic infarction  54 years old male with DM2, osteoarthritis, HTN who presented with acute onset of left-sided weakness along with speech difficulty.  CT of the head and CTA head and neck shows hypodensity in putamen.  Diffuse intracranial atherosclerosis was noted in the right M3, left M2, left A3.  Internal carotid arteries are patent  MRI brain showed multifocal areas of restricted diffusion in multiple vascular territories in periventricular distribution bilaterally as well as involving the bilateral basal ganglia and right parietal lobe.  Bilateral infarction suggests embolic event but for the time being continue dual antiplatelet therapy with baby aspirin and Plavix.  Continue to monitor for cardiac arrhythmia and if atrial fibrillation noted will switch to Eliquis.  If no cardiac arrhythmias noted during current hospitalization, schedule for outpatient 30-day event monitor  Echocardiogram with bubble study  Lipid panel LDL to 95, cholesterol 389.  Increase Lipitor to 80 mg daily  Physical, occupational, speech therapy  Vascular risk factors modification: Healthy diet (fruits, vegetables, low fat dairy & reduced saturated fat), weight loss, exercise at least 30 minutes 5 days/week, BMI goal <25.  Keep systolic blood pressure goal <130.  LDL goal <70.  Hemoglobin A1c goal <7. If applicable, STOP smoking      Neurology Discharge Planning :   SOCRATES Chin MD  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     SUBJECTIVE     No change in neurostatus.  Denies any new complaints.  Had some headache overnight that improved with Tylenol     OBJECTIVE     Vital signs in last 24 hours  Temp:  [97.1  F (36.2  C)-99  F (37.2  C)] 98.2  F (36.8  C)  Pulse:  [79-96] 81  Resp:  [15-31] 18  BP: (161-262)/() 161/77  SpO2:  [93 %-99 %] 95 %    Review of Systems   Pertinent items are noted in HPI.    General Physical Exam: Patient is alert and oriented x 3. Vital signs were reviewed and are documented in EMR. Neck was supple, no carotid bruit, thyromegaly, JVD or lymphadenopathy noted.  Neurological Exam:  Patient is alert oriented x 3 no acute distress.  Speech normal with no dysarthria or aphasia.  Cranial nerves II through XII are intact.  Motor strength on the right 5 -/5 on the left 4/5 patient has dysmetria with finger-nose testing toes bilaterally upgoing     DIAGNOSTIC STUDIES     Pertinent Radiology   Following imaging studies were reviewed:    CT  HEAD CT:  1.  No evidence of acute hemorrhage, mass effect, or acute vascular territorial infarction. Consider MRI for further evaluation, as clinically appropriate.     HEAD CTA:   1.  No evidence of proximal large vessel occlusion.  2.  Critically stenotic versus segmentally occluded posterior right M3 segment. Diffuse contour irregularity and luminal narrowing of the right middle cerebral artery is compatible with underlying atherosclerosis.  3.  Moderate to severe proximal posterior left M2 segment stenosis. Moderate mid posterior M2 segment stenosis.  4.  Moderate left A3 segment stenosis.     NECK CTA:  1.  No evidence of hemodynamically significant stenosis based  on NASCET criteria.  2.  No evidence for dissection.     CT PERFUSION  No evidence of core infarction or regional hypoperfusion.      MRI  Multifocal regions of restricted diffusion in multiple vascular distributions as could be seen with an embolic process. Chronic changes as above.     Pertinent Labs   Lab Results: Personally Reviewed  Recent Results (from the past 24 hours)   Basic metabolic panel    Collection Time: 01/18/25 11:08 AM   Result Value Ref Range    Sodium 141 135 - 145 mmol/L    Potassium 3.4 3.4 - 5.3 mmol/L    Chloride 109 (H) 98 - 107 mmol/L    Carbon Dioxide (CO2) 22 22 - 29 mmol/L    Anion Gap 10 7 - 15 mmol/L    Urea Nitrogen 32.0 (H) 6.0 - 20.0 mg/dL    Creatinine 1.70 (H) 0.67 - 1.17 mg/dL    GFR Estimate 47 (L) >60 mL/min/1.73m2    Calcium 8.7 (L) 8.8 - 10.4 mg/dL    Glucose 197 (H) 70 - 99 mg/dL   INR    Collection Time: 01/18/25 11:08 AM   Result Value Ref Range    INR 0.91 0.85 - 1.15   Partial thromboplastin time    Collection Time: 01/18/25 11:08 AM   Result Value Ref Range    aPTT 25 22 - 38 Seconds   Troponin T, High Sensitivity    Collection Time: 01/18/25 11:08 AM   Result Value Ref Range    Troponin T, High Sensitivity 39 (H) <=22 ng/L   CBC with platelets and differential    Collection Time: 01/18/25 11:08 AM   Result Value Ref Range    WBC Count 5.7 4.0 - 11.0 10e3/uL    RBC Count 5.37 4.40 - 5.90 10e6/uL    Hemoglobin 14.8 13.3 - 17.7 g/dL    Hematocrit 43.3 40.0 - 53.0 %    MCV 81 78 - 100 fL    MCH 27.6 26.5 - 33.0 pg    MCHC 34.2 31.5 - 36.5 g/dL    RDW 11.9 10.0 - 15.0 %    Platelet Count 278 150 - 450 10e3/uL    % Neutrophils 60 %    % Lymphocytes 31 %    % Monocytes 6 %    % Eosinophils 1 %    % Basophils 1 %    % Immature Granulocytes 0 %    NRBCs per 100 WBC 0 <1 /100    Absolute Neutrophils 3.5 1.6 - 8.3 10e3/uL    Absolute Lymphocytes 1.8 0.8 - 5.3 10e3/uL    Absolute Monocytes 0.4 0.0 - 1.3 10e3/uL    Absolute Eosinophils 0.1 0.0 - 0.7 10e3/uL    Absolute Basophils  0.1 0.0 - 0.2 10e3/uL    Absolute Immature Granulocytes 0.0 <=0.4 10e3/uL    Absolute NRBCs 0.0 10e3/uL   Hemoglobin A1c    Collection Time: 01/18/25 11:08 AM   Result Value Ref Range    Estimated Average Glucose 189 (H) <117 mg/dL    Hemoglobin A1C 8.2 (H) <5.7 %   Glucose by meter    Collection Time: 01/18/25 11:28 AM   Result Value Ref Range    GLUCOSE BY METER POCT 159 (H) 70 - 99 mg/dL   Troponin T, High Sensitivity    Collection Time: 01/18/25  1:22 PM   Result Value Ref Range    Troponin T, High Sensitivity 41 (H) <=22 ng/L   Lipid Profile    Collection Time: 01/18/25  1:22 PM   Result Value Ref Range    Cholesterol 389 (H) <200 mg/dL    Triglycerides 234 (H) <150 mg/dL    Direct Measure HDL 47 >=40 mg/dL    LDL Cholesterol Calculated 295 (H) <100 mg/dL    Non HDL Cholesterol 342 (H) <130 mg/dL   Glucose by meter    Collection Time: 01/18/25  6:32 PM   Result Value Ref Range    GLUCOSE BY METER POCT 122 (H) 70 - 99 mg/dL   Glucose by meter    Collection Time: 01/18/25  8:52 PM   Result Value Ref Range    GLUCOSE BY METER POCT 278 (H) 70 - 99 mg/dL   Glucose by meter    Collection Time: 01/19/25 12:26 AM   Result Value Ref Range    GLUCOSE BY METER POCT 234 (H) 70 - 99 mg/dL   CBC with platelets    Collection Time: 01/19/25  6:14 AM   Result Value Ref Range    WBC Count 5.3 4.0 - 11.0 10e3/uL    RBC Count 4.71 4.40 - 5.90 10e6/uL    Hemoglobin 13.0 (L) 13.3 - 17.7 g/dL    Hematocrit 37.6 (L) 40.0 - 53.0 %    MCV 80 78 - 100 fL    MCH 27.6 26.5 - 33.0 pg    MCHC 34.6 31.5 - 36.5 g/dL    RDW 12.1 10.0 - 15.0 %    Platelet Count 253 150 - 450 10e3/uL   Basic metabolic panel    Collection Time: 01/19/25  6:14 AM   Result Value Ref Range    Sodium 143 135 - 145 mmol/L    Potassium 3.2 (L) 3.4 - 5.3 mmol/L    Chloride 112 (H) 98 - 107 mmol/L    Carbon Dioxide (CO2) 23 22 - 29 mmol/L    Anion Gap 8 7 - 15 mmol/L    Urea Nitrogen 31.9 (H) 6.0 - 20.0 mg/dL    Creatinine 2.02 (H) 0.67 - 1.17 mg/dL    GFR Estimate 38 (L)  >60 mL/min/1.73m2    Calcium 8.4 (L) 8.8 - 10.4 mg/dL    Glucose 145 (H) 70 - 99 mg/dL   Glucose by meter    Collection Time: 01/19/25  6:29 AM   Result Value Ref Range    GLUCOSE BY METER POCT 136 (H) 70 - 99 mg/dL         HOSPITAL MEDICATIONS     Current Facility-Administered Medications   Medication Dose Route Frequency Provider Last Rate Last Admin    aspirin (ASA) chewable tablet 81 mg  81 mg Oral Daily Leobardo Chin MD        atorvastatin (LIPITOR) tablet 80 mg  80 mg Oral QPM Leobardo Chin MD        clopidogrel (PLAVIX) tablet 75 mg  75 mg Oral Daily Leobardo Chin MD        insulin aspart (NovoLOG) injection (RAPID ACTING)  1-3 Units Subcutaneous TID AC Reinaldo Gonsalves MD        insulin aspart (NovoLOG) injection (RAPID ACTING)  1-3 Units Subcutaneous At Bedtime Reinaldo Gonsalves MD   1 Units at 01/18/25 2120    sodium chloride (PF) 0.9% PF flush 3 mL  3 mL Intracatheter Q8H Reinaldo Gonsalves MD   3 mL at 01/19/25 0312        Total time spent for face to face visit, reviewing labs/imaging studies, counseling and coordination of care was: 45 Minutes More than 50% of this time was spent on counseling and coordination of care.      This note was dictated using voice recognition software.  Any grammatical or context distortions are unintentional and inherent to the software.

## 2025-01-19 NOTE — CONSULTS
"Care Management Initial Consult    General Information  Assessment completed with: Patient, Children, Spouse or significant other, Doua and wife Mar and son Binh and daughter in law  Type of CM/SW Visit: Initial Assessment    Primary Care Provider verified and updated as needed:  (\"He doesn't have a PMD or a clinic he normally goes to\". I discussed with them clinics and follow up plans. They want to \"[facility] in [place] by his house\". They were mostly concerned about his insurance covering at a clinic.)   Readmission within the last 30 days: no previous admission in last 30 days      Reason for Consult: discharge planning, insurance concerns  Advance Care Planning: Advance Care Planning Reviewed: no concerns identified          Communication Assessment  Patient's communication style: spoken language (English or Bilingual) (speaks Hmong and English)             Cognitive  Cognitive/Neuro/Behavioral: WDL  Level of Consciousness: alert  Arousal Level: opens eyes spontaneously  Orientation: oriented x 4  Mood/Behavior: cooperative, calm, behavior appropriate to situation  Best Language: 0 - No aphasia  Speech: clear, spontaneous, logical, other (see comments)    Living Environment:   People in home: spouse, child(quinn), adult  Doua and wife Mar and son Alpa age 22.  Current living Arrangements: house      Able to return to prior arrangements: yes       Family/Social Support:  Care provided by: self  Provides care for: no one  Marital Status:   Support system: Wife, Children  Mar       Description of Support System: Supportive, Involved    Support Assessment: Adequate social supports, Adequate family and caregiver support, Patient communicates needs well met    Current Resources:   Patient receiving home care services: No        Community Resources: None  Equipment currently used at home: none  Supplies currently used at home: Other (\"glasses\")    Employment/Financial:  Employment Status: " "employed full-time     Employment/ Comments: \"no  history\"  Financial Concerns: none   Referral to Financial Worker: No       Does the patient's insurance plan have a 3 day qualifying hospital stay waiver?  No    Lifestyle & Psychosocial Needs:  Social Drivers of Health     Food Insecurity: Not on file   Depression: Not at risk (11/23/2020)    PHQ-2     PHQ-2 Score: 0   Housing Stability: Not on file   Tobacco Use: Low Risk  (1/18/2025)    Patient History     Smoking Tobacco Use: Never     Smokeless Tobacco Use: Never     Passive Exposure: Not on file   Financial Resource Strain: Not on file   Alcohol Use: Not on file   Transportation Needs: Not on file   Physical Activity: Not on file   Interpersonal Safety: Not on file   Stress: Not on file   Social Connections: Not on file   Health Literacy: Not on file       Functional Status:  Prior to admission patient needed assistance:   Dependent ADLs:: Independent, Ambulation-no assistive device  Dependent IADLs:: Independent  Assesssment of Functional Status:  (unknown at this time.)    Mental Health Status:  Mental Health Status: No Current Concerns       Chemical Dependency Status:  Chemical Dependency Status: No Current Concerns             Values/Beliefs:  Spiritual, Cultural Beliefs, Orthodoxy Practices, Values that affect care: no               Discussed  Partnership in Safe Discharge Planning  document with patient/family: No    Additional Information:  Pérez lives in a house with his wife Mar and son Alpa age 22. He is independent with ADLs and IADLs at baseline. He drives and works full time.    \"He doesn't have a PMD or a clinic he normally goes to\". I discussed with him and his family about clinics and follow up plans. They want to \"go to [facility] in [place] by his house\". They were mostly concerned about his insurance covering at a clinic. I helped answer there insurance questions. Son states, \"I will make sure he goes to the " "doctor this time for follow up. We go to this clinic, so we think he will be willing to go there too\".    Family to transport at discharge.    CM to follow for medical progression of care, discharge recommendations, and final discharge plan. Writer verified patient demographics and updated any changes needed in the patient chart.    Next Steps:   Plan: Unknown at this time. Awaiting neurology plan.  Needs: Awaiting PT recommendations.    Mar Fenton RN    "

## 2025-01-19 NOTE — PROGRESS NOTES
"[facility]    Medicine Progress Note - Hospitalist Service    Date of Admission:  1/18/2025    Assessment & Plan   54 year old male with history of DM2, HTN and OA, not on any medications due to medical noncompliance who presents 1/18/25 with acute CVA      Acute ischemic stroke:  MRI showing multifocal areas of restricted diffusion in multiple vascular territories in periventricular distribution bilaterally as well as involving the bilateral basal ganglia and right parietal lobe suggesting embolic event.  No carotid stenosis seen on imaging.    -- follow up TTE  -- monitor on tele  -- lift permissive HTN  -- continue ASA, statin, plavix  -- BP control, glucose control  -- plan outpatient 30-day event monitor   -- PT/OT      Poorly controlled HTN  -- start losartan 25 mg Bid  -- have prn hydrdalazine and clonidine available  -- escalate as needed      DM2:   -- start lantus, medium sliding scale insulin and escalate as needed      Renal insufficiency:  -- suspect progression of hypertensive nephrosclerosis  -- start ARB therapy as above and trend GFR      Hypokalemia: replace and recheck             Diet: Moderate Consistent Carb (60 g CHO per Meal) Diet    DVT Prophylaxis: Pneumatic Compression Devices  Wisdom Catheter: Not present  Lines: None     Cardiac Monitoring: ACTIVE order. Indication: Stroke, acute (48 hours)  Code Status: Full Code      Clinically Significant Risk Factors Present on Admission        # Hypokalemia: Lowest K = 3.2 mmol/L in last 2 days, will replace as needed   # Hyperchloremia: Highest Cl = 112 mmol/L in last 2 days, will monitor as appropriate              # Hypertension: Noted on problem list          # DMII: A1C = 8.2 % (Ref range: <5.7 %) within past 6 months    # Overweight: Estimated body mass index is 25.51 kg/m  as calculated from the following:    Height as of this encounter: 1.6 m (5' 3\").    Weight as of this encounter: 65.3 kg (144 lb).       # " Financial/Environmental Concerns: none         Disposition Plan   Medically Ready for Discharge: Anticipated Tomorrow      Julio Ceja,   Hospitalist Service  [facility]  Securely message with [employer] (more info)  Text page via [employer] Paging/Directory   ______________________________________________________________________    Interval History   NAD. Denies any current complaints    Physical Exam   Vital Signs: Temp: 98.2  F (36.8  C) Temp src: Oral BP: (!) 193/92 (after hydralazine) Pulse: 81   Resp: 16 SpO2: 95 % O2 Device: None (Room air)    Weight: 144 lbs 0 oz  General: NAD  RESPIRATORY: Breathing nonlabored  CARDIOVASCULAR: No le edema bilat.   NEUROLOGIC: Alert, speech clear         >50 MINUTES SPENT BY ME on the date of service doing chart review, history, exam, documentation & further activities per the note.  Data

## 2025-01-19 NOTE — PROGRESS NOTES
Stroke Education Note    The following information was reviewed with patient:    - Activation of emergency medical system (when to call 911)    - Warning signs and symptoms of stroke:   B = Balance loss   E = Eyesight changes   F = Facial droop or numbness   A = Arm or leg weakness   S = Speech difficulty, slurred speech   T = Time to call 911 for help    Written stroke educational materials given:   - Learning about BE FAST: Stroke Warning Signs and Learning about Risk Factors for Stroke (Healthwise)   - Understanding Stroke: Key Resources After a Stroke (FOD #811435)    Learner's response to education:     reasons to change     Olga Munoz, RN

## 2025-01-19 NOTE — PLAN OF CARE
Problem: Adult Inpatient Plan of Care  Goal: Optimal Comfort and Wellbeing  Outcome: Progressing  Intervention: Monitor Pain and Promote Comfort  Recent Flowsheet Documentation  Taken 1/18/2025 2103 by Olga Munoz, RN  Pain Management Interventions: (No prns) MD notified (comment)   Goal Outcome Evaluation:    Pt arrived to the unit from ED at 1630. A+O x4, on RA. Has headache managed by prn tylenol x1. Intermittent dizziness. Scoring 6 on NIHSS for left sided motor + sensory deficits, facial droop and ataxia. Bp 237/116, prn hydralazine administered x1. NSR on tele. Still needs Echo.  Eating,drinking and voiding well. Up one assist with belt and walker. Visited with family at bedside. Pt is able to make his needs known.     Olga Munoz, PRIYANK.

## 2025-01-19 NOTE — PLAN OF CARE
Problem: Adult Inpatient Plan of Care  Goal: Absence of Hospital-Acquired Illness or Injury  Intervention: Identify and Manage Fall Risk  Recent Flowsheet Documentation  Taken 1/19/2025 0100 by Catherine Daniel RN  Safety Promotion/Fall Prevention:   activity supervised   mobility aid in reach   clutter free environment maintained   safety round/check completed     Problem: Adult Inpatient Plan of Care  Goal: Absence of Hospital-Acquired Illness or Injury  Intervention: Prevent Skin Injury  Recent Flowsheet Documentation  Taken 1/19/2025 0200 by Catherine Daniel RN  Body Position: position changed independently  Taken 1/19/2025 0000 by Catherine Daniel RN  Body Position: position changed independently     Problem: Adult Inpatient Plan of Care  Goal: Absence of Hospital-Acquired Illness or Injury  Intervention: Prevent and Manage VTE (Venous Thromboembolism) Risk  Recent Flowsheet Documentation  Taken 1/19/2025 0100 by Catherine Daniel RN  VTE Prevention/Management: SCDs on (sequential compression devices)     Problem: Stroke, Ischemic (Includes Transient Ischemic Attack)  Goal: Optimal Functional Ability  Intervention: Optimize Functional Ability  Recent Flowsheet Documentation  Taken 1/19/2025 0200 by Catherine Daniel RN  Activity Management:   activity adjusted per tolerance   ambulated to bathroom  Taken 1/19/2025 0000 by Catherine Daniel RN  Activity Management: activity adjusted per tolerance   Goal Outcome Evaluation:    Pt A/O, VSS on RA, denies pain   NIHS scores 6 and 6  BG checks on 60 g carb diet : 234 and 136  Tele: normal sinus  Spouse at bedside  Ambulated to bathroom and back to bed with A1, walker, and GB  Left sided weakness

## 2025-01-19 NOTE — PLAN OF CARE
Problem: Adult Inpatient Plan of Care  Goal: Optimal Comfort and Wellbeing  Outcome: Progressing  Problem: Stroke, Ischemic (Includes Transient Ischemic Attack)  Goal: Optimal Functional Ability  Outcome: Progressing     AO. BP elevated this afternoon, PRN hydralazine given @1205, orders received and PRN clonidine given @1300. VS otherwise stable on RA. Pt co headache this afternoon after high BP, PRN tylenol also given @1300 with relief of symptoms. NIH score 2 for minor Left sided facial paralysis, mild dysarthria. Pt states Left side no longer feels numb, no drift present. Family present at bedside.    Sawyer Jeffery RN

## 2025-01-20 ENCOUNTER — APPOINTMENT (OUTPATIENT)
Dept: PHYSICAL THERAPY | Facility: HOSPITAL | Age: 55
DRG: 065 | End: 2025-01-20
Payer: COMMERCIAL

## 2025-01-20 VITALS
BODY MASS INDEX: 25.52 KG/M2 | OXYGEN SATURATION: 96 % | HEIGHT: 63 IN | DIASTOLIC BLOOD PRESSURE: 79 MMHG | SYSTOLIC BLOOD PRESSURE: 162 MMHG | TEMPERATURE: 99.1 F | HEART RATE: 95 BPM | RESPIRATION RATE: 16 BRPM | WEIGHT: 144 LBS

## 2025-01-20 LAB
ANION GAP SERPL CALCULATED.3IONS-SCNC: 7 MMOL/L (ref 7–15)
BUN SERPL-MCNC: 29.9 MG/DL (ref 6–20)
CALCIUM SERPL-MCNC: 8.4 MG/DL (ref 8.8–10.4)
CHLORIDE SERPL-SCNC: 111 MMOL/L (ref 98–107)
CREAT SERPL-MCNC: 2 MG/DL (ref 0.67–1.17)
EGFRCR SERPLBLD CKD-EPI 2021: 38 ML/MIN/1.73M2
GLUCOSE BLDC GLUCOMTR-MCNC: 108 MG/DL (ref 70–99)
GLUCOSE BLDC GLUCOMTR-MCNC: 149 MG/DL (ref 70–99)
GLUCOSE BLDC GLUCOMTR-MCNC: 160 MG/DL (ref 70–99)
GLUCOSE SERPL-MCNC: 114 MG/DL (ref 70–99)
HCO3 SERPL-SCNC: 22 MMOL/L (ref 22–29)
HOLD SPECIMEN: NORMAL
POTASSIUM SERPL-SCNC: 3.4 MMOL/L (ref 3.4–5.3)
SODIUM SERPL-SCNC: 140 MMOL/L (ref 135–145)

## 2025-01-20 PROCEDURE — 80048 BASIC METABOLIC PNL TOTAL CA: CPT | Performed by: INTERNAL MEDICINE

## 2025-01-20 PROCEDURE — 97116 GAIT TRAINING THERAPY: CPT | Mod: GP | Performed by: PHYSICAL THERAPIST

## 2025-01-20 PROCEDURE — 99239 HOSP IP/OBS DSCHRG MGMT >30: CPT | Performed by: INTERNAL MEDICINE

## 2025-01-20 PROCEDURE — 250N000013 HC RX MED GY IP 250 OP 250 PS 637: Performed by: PSYCHIATRY & NEUROLOGY

## 2025-01-20 PROCEDURE — 250N000013 HC RX MED GY IP 250 OP 250 PS 637: Performed by: INTERNAL MEDICINE

## 2025-01-20 PROCEDURE — 250N000011 HC RX IP 250 OP 636: Performed by: INTERNAL MEDICINE

## 2025-01-20 PROCEDURE — 36415 COLL VENOUS BLD VENIPUNCTURE: CPT | Performed by: INTERNAL MEDICINE

## 2025-01-20 PROCEDURE — 82374 ASSAY BLOOD CARBON DIOXIDE: CPT | Performed by: INTERNAL MEDICINE

## 2025-01-20 PROCEDURE — 99232 SBSQ HOSP IP/OBS MODERATE 35: CPT | Performed by: PSYCHIATRY & NEUROLOGY

## 2025-01-20 RX ORDER — LOSARTAN POTASSIUM 25 MG/1
25 TABLET ORAL DAILY
Status: DISCONTINUED | OUTPATIENT
Start: 2025-01-20 | End: 2025-01-20

## 2025-01-20 RX ORDER — HYDRALAZINE HYDROCHLORIDE 20 MG/ML
10 INJECTION INTRAMUSCULAR; INTRAVENOUS EVERY 4 HOURS PRN
Status: DISCONTINUED | OUTPATIENT
Start: 2025-01-20 | End: 2025-01-20 | Stop reason: HOSPADM

## 2025-01-20 RX ORDER — ASPIRIN 81 MG/1
81 TABLET, CHEWABLE ORAL DAILY
Qty: 90 TABLET | Refills: 0 | Status: SHIPPED | OUTPATIENT
Start: 2025-01-21 | End: 2025-04-21

## 2025-01-20 RX ORDER — LOSARTAN POTASSIUM 50 MG/1
50 TABLET ORAL DAILY
Status: DISCONTINUED | OUTPATIENT
Start: 2025-01-20 | End: 2025-01-20

## 2025-01-20 RX ORDER — CLOPIDOGREL BISULFATE 75 MG/1
75 TABLET ORAL DAILY
Qty: 90 TABLET | Refills: 0 | Status: SHIPPED | OUTPATIENT
Start: 2025-01-21 | End: 2025-04-21

## 2025-01-20 RX ORDER — LOSARTAN POTASSIUM 50 MG/1
50 TABLET ORAL DAILY
Status: DISCONTINUED | OUTPATIENT
Start: 2025-01-21 | End: 2025-01-20 | Stop reason: HOSPADM

## 2025-01-20 RX ORDER — LOSARTAN POTASSIUM 25 MG/1
25 TABLET ORAL ONCE
Status: DISCONTINUED | OUTPATIENT
Start: 2025-01-20 | End: 2025-01-20

## 2025-01-20 RX ORDER — LOSARTAN POTASSIUM 50 MG/1
50 TABLET ORAL DAILY
Qty: 30 TABLET | Refills: 0 | Status: SHIPPED | OUTPATIENT
Start: 2025-01-21

## 2025-01-20 RX ORDER — ATORVASTATIN CALCIUM 80 MG/1
80 TABLET, FILM COATED ORAL EVERY EVENING
Qty: 30 TABLET | Refills: 1 | Status: SHIPPED | OUTPATIENT
Start: 2025-01-20

## 2025-01-20 RX ORDER — LOSARTAN POTASSIUM 50 MG/1
50 TABLET ORAL 2 TIMES DAILY
Status: DISCONTINUED | OUTPATIENT
Start: 2025-01-20 | End: 2025-01-20

## 2025-01-20 RX ORDER — ACETAMINOPHEN 325 MG/1
650 TABLET ORAL EVERY 4 HOURS PRN
COMMUNITY
Start: 2025-01-20

## 2025-01-20 RX ORDER — POTASSIUM CHLORIDE 1500 MG/1
40 TABLET, EXTENDED RELEASE ORAL ONCE
Status: COMPLETED | OUTPATIENT
Start: 2025-01-20 | End: 2025-01-20

## 2025-01-20 RX ADMIN — HYDRALAZINE HYDROCHLORIDE 10 MG: 20 INJECTION INTRAMUSCULAR; INTRAVENOUS at 08:10

## 2025-01-20 RX ADMIN — POTASSIUM CHLORIDE 40 MEQ: 1500 TABLET, EXTENDED RELEASE ORAL at 08:47

## 2025-01-20 RX ADMIN — ASPIRIN 81 MG CHEWABLE TABLET 81 MG: 81 TABLET CHEWABLE at 08:46

## 2025-01-20 RX ADMIN — CLOPIDOGREL BISULFATE 75 MG: 75 TABLET ORAL at 08:47

## 2025-01-20 RX ADMIN — LOSARTAN POTASSIUM 25 MG: 25 TABLET, FILM COATED ORAL at 08:47

## 2025-01-20 ASSESSMENT — ACTIVITIES OF DAILY LIVING (ADL)
ADLS_ACUITY_SCORE: 31
ADLS_ACUITY_SCORE: 22
ADLS_ACUITY_SCORE: 22
ADLS_ACUITY_SCORE: 31
ADLS_ACUITY_SCORE: 22

## 2025-01-20 NOTE — PROGRESS NOTES
Care Management Discharge Note    Discharge Date: 01/20/2025       Discharge Disposition: Home, Outpatient Rehab (PT, OT, SLP, Cardiac or Pulmonary)    Discharge Services: None    Discharge DME: None    Discharge Transportation: family or friend will provide    Private pay costs discussed: Not applicable    Does the patient's insurance plan have a 3 day qualifying hospital stay waiver?  No    PAS Confirmation Code:  [id number]  Patient/family educated on Medicare website which has current facility and service quality ratings:  na    Education Provided on the Discharge Plan:    Persons Notified of Discharge Plans: family  Patient/Family in Agreement with the Plan: yes    Handoff Referral Completed: No, handoff not indicated or clinically appropriate    Additional Information:  Plan is for patient to return home with family; they will transport. Patient will attend outpatient therapy    Angeline Ramos RN

## 2025-01-20 NOTE — DISCHARGE SUMMARY
[facility]  Hospitalist Discharge Summary      Date of Admission:  1/18/2025  Date of Discharge:  1/20/2025 12:52 PM  Discharging Provider: Julio Ceja,   Discharge Service: Hospitalist Service        Follow-ups Needed After Discharge   Follow-up Appointments       Hospital to Primary Care - Establish PCP Referral      Please be aware that coverage of these services is subject to the terms and limitations of your health insurance plan.  Call member services at your health plan with any benefit or coverage questions.    Schedule Primary Care visit within: 7 Days   Recommended labs and Imaging (to be ordered by Primary Care Provider): BMP, BP check, review home glucoses, titrate insulin and BP meds               Unresulted Labs Ordered in the Past 30 Days of this Admission       No orders found from 12/19/2024 to 1/19/2025.        These results will be followed up by Hospitalist results pool    Discharge Disposition   Discharged to home  Condition at discharge: Stable      Hospital Course   54 year old male with history of DM2, HTN and OA, not on any medications due to medical noncompliance who presents 1/18/25 with acute CVA        Acute ischemic stroke:  MRI showing multifocal areas of restricted diffusion in multiple vascular territories in periventricular distribution bilaterally as well as involving the bilateral basal ganglia and right parietal lobe suggesting embolic event.  No carotid stenosis seen on imaging.    TTE normal  -- continue ASA, statin, plavix  -- plan baby ASA and plavix for 90 days, followed by full strength ASA daily thereafter   -- plan outpatient 30-day event monitor   -- outpatient PT  -- outpatient PCP and neurology follow up        Poorly controlled HTN  -- start losartan 50 mg daily  -- neurology recommends 1 week of permissive HTN and then work on further antihypertensive management        DM2:   -- start lantus 10U at bedtime and aspart 5U TID AC  --  monitor and record home glucoses  -- outpatient PCP follow up        Renal insufficiency:  -- suspect progression of hypertensive nephrosclerosis  -- start ARB therapy as above and trend GFR as outpatient        Hypokalemia: replaced                Consultations This Hospital Stay   NEUROLOGY IP STROKE CONSULT  PHYSICAL THERAPY ADULT IP CONSULT  REHAB ADMISSIONS LIAISON IP CONSULT  CARE MANAGEMENT / SOCIAL WORK IP CONSULT    Code Status   Prior    Time Spent on this Encounter   IJulio DO, personally saw the patient today and spent greater than 30 minutes discharging this patient.       Julio Ceja DO  [address] [address]  Phone: [phone]  Fax: [phone]  ______________________________________________________________________    Physical Exam   Vital Signs: Temp: 99.1  F (37.3  C) Temp src: Oral BP: (!) 162/79 Pulse: 95   Resp: 16 SpO2: 96 % O2 Device: None (Room air)    Weight: 144 lbs 0 oz    General: NAD  RESPIRATORY: Respirations nonlabored  CARDIOVASCULAR: No le edema bilat.  NEUROLOGIC: Alert, speech is clear    Primary Care Physician   Physician No Ref-Primary    Discharge Orders      Physical Therapy  Referral      Hospital to Primary Care - Establish PCP Referral      Reason for your hospital stay    Stroke, HTN, poorly controlled diabetes     Activity    Your activity upon discharge: activity as tolerated     Monitor and record    Blood glucose: 3 times a day, before meals     Cardiac Event Monitor Adult/Pediatric     Walker Order for DME - ONLY FOR DME     Diet    Follow this diet upon discharge: Current Diet:Orders Placed This Encounter      Moderate Consistent Carb (60 g CHO per Meal) Diet     Stroke Hospital Follow Up    Please be aware that coverage of these services is subject to the terms and limitations of your health insurance plan.  Call member services at your health plan with any benefit or  coverage questions.  [facility] will call you to coordinate care as prescribed by your provider. If you don t hear from a representative within 2 business days, please call [phone].       \    Discharge Medications   Discharge Medication List as of 1/20/2025 12:40 PM        START taking these medications    Details   acetaminophen (TYLENOL) 325 MG tablet Take 2 tablets (650 mg) by mouth every 4 hours as needed for mild pain., OTC      aspirin (ASA) 81 MG chewable tablet Take 1 tablet (81 mg) by mouth daily., Disp-90 tablet, R-0, E-Prescribe      atorvastatin (LIPITOR) 80 MG tablet Take 1 tablet (80 mg) by mouth every evening., Disp-30 tablet, R-1, E-Prescribe      blood glucose (NO BRAND SPECIFIED) lancets standard Use to test blood sugar 3 times daily or as directed.Disp-200 Lancet, [id number]      blood glucose (NO BRAND SPECIFIED) test strip Use to test blood sugar 3 times daily or as directed., Disp-200 strip, R-1, E-Prescribe      blood glucose monitoring (NO BRAND SPECIFIED) meter device kit Use to test blood sugar 3 times daily or as directed.Disp-1 kit, [id number]      clopidogrel (PLAVIX) 75 MG tablet Take 1 tablet (75 mg) by mouth daily., Disp-90 tablet, R-0, E-Prescribe      insulin aspart (NOVOLOG PEN) 100 UNIT/ML pen Inject 5 Units subcutaneously 3 times daily (with meals)., Disp-15 mL, R-1, E-Prescribe      insulin glargine (LANTUS PEN) 100 UNIT/ML pen Inject 10 Units subcutaneously at bedtime., Disp-15 mL, R-1, E-PrescribeIf Lantus is not covered by insurance, may substitute Basaglar or Semglee or other insulin glargine product per insurance preference at same dose and frequency.        losartan (COZAAR) 50 MG tablet Take 1 tablet (50 mg) by mouth daily., Disp-30 tablet, R-0, E-Prescribe           Allergies   No Known Allergies

## 2025-01-20 NOTE — PLAN OF CARE
"  Problem: Adult Inpatient Plan of Care  Goal: Absence of Hospital-Acquired Illness or Injury  Outcome: Met  Intervention: Identify and Manage Fall Risk  Recent Flowsheet Documentation  Taken 1/20/2025 0812 by Mari Roper, RN  Safety Promotion/Fall Prevention:   activity supervised   lighting adjusted   nonskid shoes/slippers when out of bed   safety round/check completed  Intervention: Prevent and Manage VTE (Venous Thromboembolism) Risk  Recent Flowsheet Documentation  Taken 1/20/2025 0812 by Mari Roper, RN  VTE Prevention/Management:   SCDs off (sequential compression devices)   patient refused intervention  Intervention: Prevent Infection  Recent Flowsheet Documentation  Taken 1/20/2025 0812 by Mari Roper, RN  Infection Prevention:   hand hygiene promoted   rest/sleep promoted     BP (!) 162/79 (BP Location: Left arm)   Pulse 95   Temp 99.1  F (37.3  C) (Oral)   Resp 16   Ht 1.6 m (5' 3\")   Wt 65.3 kg (144 lb)   SpO2 96%   BMI 25.51 kg/m      Pt up with assist of 1, family at bedside. NIH score 2. Tele NSR. All education provided for stroke and medications. Paperwork completed and questions answered.   "

## 2025-01-20 NOTE — PLAN OF CARE
Problem: Adult Inpatient Plan of Care  Goal: Optimal Comfort and Wellbeing  Outcome: Progressing  Intervention: Monitor Pain and Promote Comfort  Recent Flowsheet Documentation  Taken 1/19/2025 2038 by Olga Munoz, RN  Pain Management Interventions: medication (see MAR)   Goal Outcome Evaluation:    Pt A+O x4, on RA. Scoring 4 on NIHS for minor facial droop, left sided motor and sensory deficits.  /91, administered prn hydralazine x1 with good effect. Headache managed by prn tylenol x1. Potassium protocol AM recheck. NSR on telemetry, briefly had inverted twave- printed strip in paper chart. Ambulated in hallway, one assist with belt and walker.  Visited with family at bedside. Pt is able to make his needs known.     Olga Munoz RN.

## 2025-01-20 NOTE — PLAN OF CARE
Problem: Adult Inpatient Plan of Care  Goal: Absence of Hospital-Acquired Illness or Injury  Intervention: Identify and Manage Fall Risk  Recent Flowsheet Documentation  Taken 1/20/2025 0025 by Catherine Daniel RN  Safety Promotion/Fall Prevention:   activity supervised   lighting adjusted   nonskid shoes/slippers when out of bed   safety round/check completed     Problem: Adult Inpatient Plan of Care  Goal: Absence of Hospital-Acquired Illness or Injury  Intervention: Prevent Skin Injury  Recent Flowsheet Documentation  Taken 1/20/2025 0025 by Catherine Daniel RN  Body Position: position changed independently     Problem: Adult Inpatient Plan of Care  Goal: Absence of Hospital-Acquired Illness or Injury  Intervention: Prevent and Manage VTE (Venous Thromboembolism) Risk  Recent Flowsheet Documentation  Taken 1/20/2025 0025 by Catherine Daniel RN  VTE Prevention/Management:   SCDs off (sequential compression devices)   patient refused intervention   Goal Outcome Evaluation:    Pt A/O, VSS on RA, BP elevated but within pt parameter, denies pain  NIHSS scored 4 and 2  Neuros intact  Ambulates to the bathroom A1 with gate belt    Tele: NSR, prolonged QT/Qtc

## 2025-01-20 NOTE — PLAN OF CARE
Physical Therapy Discharge Summary    Reason for therapy discharge:    Discharged to home.    Progress towards therapy goal(s). See goals on Care Plan in [facility] electronic health record for goal details.  Goals partially met.  Barriers to achieving goals:   discharge from facility.    Therapy recommendation(s):    Continued therapy is recommended.  Rationale/Recommendations:  recommend outpatient PT for L LE strengthening.    Clare Fraser, PT  1/20/2025

## 2025-01-20 NOTE — PROGRESS NOTES
NEUROLOGY INPATIENT PROGRESS NOTE       [facility] NEUROLOGY [place]  [address] [place] [address]  Tel: [phone]  Fax: [phone]  [email or web address]     ASSESSMENT & PLAN   Date: 01/20/2025  Hospital Day#: 2  Visit diagnosis: Bilateral embolic infarction    Bilateral embolic infarction  54 years old male with DM2, osteoarthritis, HTN who presented with acute onset of left-sided weakness along with speech difficulty.  CT of the head and CTA head and neck shows hypodensity in putamen.  Diffuse intracranial atherosclerosis noted in the right M3, left M2, left A3.  Internal carotid arteries are patent bilaterally.  MRI brain showed multifocal areas of restricted diffusion in multiple vascular territories in periventricular distribution bilaterally as well as involving the bilateral basal ganglia and right parietal lobe.  Bilateral infarction suggests embolic event but for the time being continue dual antiplatelet therapy with baby aspirin and Plavix for 90 days, after 90 days switch to enteric-coated aspirin 325 mg daily.    Continue to monitor for cardiac arrhythmia and if atrial fibrillation noted will switch to Eliquis.  If no cardiac arrhythmias noted during current hospitalization, schedule for outpatient 30-day event monitor  Echocardiogram shows normal ejection fraction with mildly dilated left atrium and no PFO noted.  No significant valvular heart abnormality  Lipid panel , cholesterol 389.  Increase Lipitor to 80 mg daily  Physical, occupational, speech therapy consult appreciated.  They feel patient can return home with assistance and outpatient physical therapy  Permissive hypertension in the first week, in 1 week Target 120-130/80-90  Vascular risk factors modification: Healthy diet (fruits, vegetables, low fat dairy & reduced saturated fat), weight loss, exercise at least 30 minutes 5 days/week, BMI goal <25.  Keep systolic blood pressure goal <130.  LDL goal  <70.  Hemoglobin A1c goal <7. If applicable, STOP smoking    Neurology Discharge Planning :   Discharge when okay with hospitalist.  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     SUBJECTIVE     Patient alert and oriented continues to have left-sided deficit.  Blood pressure running within target range.     OBJECTIVE     Vital signs in last 24 hours  Temp:  [98.1  F (36.7  C)-98.9  F (37.2  C)] 98.9  F (37.2  C)  Pulse:  [80-86] 80  Resp:  [16-19] 18  BP: (134-200)/(60-96) 158/82  SpO2:  [95 %-96 %] 96 %    Review of Systems   Pertinent items are noted in HPI.    General Physical Exam: Patient is alert and oriented x 3. Vital signs were reviewed and are documented in EMR. Neck was supple, no carotid bruit, thyromegaly, JVD or lymphadenopathy noted.  Neurological Exam:  Patient is alert oriented x 3 no acute distress.  Speech normal with no dysarthria or aphasia.  Cranial nerves II through XII are intact.  Motor strength on the right 5 -/5 on the left 4/5 patient has dysmetria with finger-nose testing toes bilaterally upgoing     DIAGNOSTIC STUDIES     Pertinent Radiology   Following imaging studies were reviewed:    CT  HEAD CT:  1.  No evidence of acute hemorrhage, mass effect, or acute vascular territorial infarction. Consider MRI for further evaluation, as clinically appropriate.     HEAD CTA:   1.  No evidence of proximal large vessel occlusion.  2.   Critically stenotic versus segmentally occluded posterior right M3 segment. Diffuse contour irregularity and luminal narrowing of the right middle cerebral artery is compatible with underlying atherosclerosis.  3.  Moderate to severe proximal posterior left M2 segment stenosis. Moderate mid posterior M2 segment stenosis.  4.  Moderate left A3 segment stenosis.     NECK CTA:  1.  No evidence of hemodynamically significant stenosis based on NASCET criteria.  2.  No evidence for dissection.     CT PERFUSION  No evidence of core infarction or regional hypoperfusion.      MRI  Multifocal regions of restricted diffusion in multiple vascular distributions as could be seen with an embolic process. Chronic changes as above.     Echocardiogram  Left ventricular function is normal.The ejection fraction is 60-65%. Moderate  asymetric septal hypertrophy (2.0 cm).  Normal right ventricle size and systolic function.  The left atrium is mildly dilated.  A contrast injection (Bubble Study) was performed that was negative for flow  across the interatrial septum.  No hemodynamically significant valvular abnormalities on 2D or color flow  imaging  Pertinent Labs   Lab Results: Personally Reviewed  Recent Results (from the past 24 hours)   Glucose by meter    Collection Time: 01/19/25  8:16 AM   Result Value Ref Range    GLUCOSE BY METER POCT 150 (H) 70 - 99 mg/dL   Glucose by meter    Collection Time: 01/19/25 12:00 PM   Result Value Ref Range    GLUCOSE BY METER POCT 214 (H) 70 - 99 mg/dL   Echocardiogram Complete    Collection Time: 01/19/25  2:42 PM   Result Value Ref Range    LVEF  60-65%    Glucose by meter    Collection Time: 01/19/25  5:33 PM   Result Value Ref Range    GLUCOSE BY METER POCT 204 (H) 70 - 99 mg/dL   Glucose by meter    Collection Time: 01/19/25  8:43 PM   Result Value Ref Range    GLUCOSE BY METER POCT 302 (H) 70 - 99 mg/dL   Potassium    Collection Time: 01/19/25  8:48 PM   Result Value Ref Range    Potassium  3.7 3.4 - 5.3 mmol/L   Glucose by meter    Collection Time: 01/20/25  2:24 AM   Result Value Ref Range    GLUCOSE BY METER POCT 149 (H) 70 - 99 mg/dL         HOSPITAL MEDICATIONS     Current Facility-Administered Medications   Medication Dose Route Frequency Provider Last Rate Last Admin    aspirin (ASA) chewable tablet 81 mg  81 mg Oral Daily Leobardo Chin MD   81 mg at 01/19/25 0838    atorvastatin (LIPITOR) tablet 80 mg  80 mg Oral QPM Leobardo Chin MD   80 mg at 01/19/25 2044    clopidogrel (PLAVIX) tablet 75 mg  75 mg Oral Daily Leobardo Chin MD   75 mg at 01/19/25 0838    insulin aspart (NovoLOG) injection (RAPID ACTING)  1-7 Units Subcutaneous TID AC Julio Ceja DO   2 Units at 01/19/25 1734    insulin aspart (NovoLOG) injection (RAPID ACTING)  1-5 Units Subcutaneous At Bedtime Julio Ceja DO   3 Units at 01/19/25 2046    insulin glargine (LANTUS PEN) injection 10 Units  10 Units Subcutaneous At Bedtime Julio Ceja DO   10 Units at 01/19/25 2047    losartan (COZAAR) tablet 25 mg  25 mg Oral BID Julio Ceja, DO   25 mg at 01/19/25 2044    sodium chloride (PF) 0.9% PF flush 3 mL  3 mL Intracatheter Q8H Reinaldo Gonsalves MD   3 mL at 01/20/25 0524        Total time spent for face to face visit, reviewing labs/imaging studies, counseling and coordination of care was: 45 Minutes More than 50% of this time was spent on counseling and coordination of care.      This note was dictated using voice recognition software.  Any grammatical or context distortions are unintentional and inherent to the software.

## 2025-01-22 ENCOUNTER — APPOINTMENT (OUTPATIENT)
Dept: INTERPRETER SERVICES | Facility: CLINIC | Age: 55
End: 2025-01-22
Payer: COMMERCIAL

## 2025-01-22 ENCOUNTER — PATIENT OUTREACH (OUTPATIENT)
Dept: CARE COORDINATION | Facility: CLINIC | Age: 55
End: 2025-01-22
Payer: COMMERCIAL

## 2025-01-22 RX ORDER — INSULIN LISPRO 100 [IU]/ML
5 INJECTION, SOLUTION INTRAVENOUS; SUBCUTANEOUS
Qty: 15 ML | Refills: 1 | Status: SHIPPED | OUTPATIENT
Start: 2025-01-22

## 2025-01-22 NOTE — PROGRESS NOTES
Clinic Care Coordination Contact  [facility]/Voicemail    Clinical Data: Care Coordinator Outreach    Outreach Documentation Number of Outreach Attempt   1/22/2025   9:52 AM 2       Left message on patient's voicemail with call back information and requested return call.    Care Coordinator will do no further outreaches at this time.    Zahida Birmingham  [phone]  Care

## 2025-01-22 NOTE — SIGNIFICANT EVENT
Significant Event Note 1/22/25    Description of event:  Paged that patient needed prescription for insulin needles - sent 100 to pharmacy.    Also patient's insurance did not cover insulin aspart - sent insulin lispro as substitute with same instructions    Binh Montgomery MD

## 2025-01-27 ENCOUNTER — LAB REQUISITION (OUTPATIENT)
Dept: LAB | Facility: CLINIC | Age: 55
End: 2025-01-27
Payer: COMMERCIAL

## 2025-01-27 DIAGNOSIS — I10 ESSENTIAL (PRIMARY) HYPERTENSION: ICD-10-CM

## 2025-01-27 PROCEDURE — 80048 BASIC METABOLIC PNL TOTAL CA: CPT | Mod: ORL | Performed by: STUDENT IN AN ORGANIZED HEALTH CARE EDUCATION/TRAINING PROGRAM

## 2025-01-28 ENCOUNTER — THERAPY VISIT (OUTPATIENT)
Dept: PHYSICAL THERAPY | Facility: REHABILITATION | Age: 55
End: 2025-01-28
Attending: INTERNAL MEDICINE
Payer: COMMERCIAL

## 2025-01-28 DIAGNOSIS — R53.1 LEFT-SIDED WEAKNESS: ICD-10-CM

## 2025-01-28 DIAGNOSIS — Z74.09 DECREASED FUNCTIONAL MOBILITY AND ENDURANCE: Primary | ICD-10-CM

## 2025-01-28 LAB
ANION GAP SERPL CALCULATED.3IONS-SCNC: 10 MMOL/L (ref 7–15)
BUN SERPL-MCNC: 35.7 MG/DL (ref 6–20)
CALCIUM SERPL-MCNC: 8.4 MG/DL (ref 8.8–10.4)
CHLORIDE SERPL-SCNC: 107 MMOL/L (ref 98–107)
CREAT SERPL-MCNC: 1.99 MG/DL (ref 0.67–1.17)
EGFRCR SERPLBLD CKD-EPI 2021: 39 ML/MIN/1.73M2
GLUCOSE SERPL-MCNC: 160 MG/DL (ref 70–99)
HCO3 SERPL-SCNC: 24 MMOL/L (ref 22–29)
POTASSIUM SERPL-SCNC: 4.3 MMOL/L (ref 3.4–5.3)
SODIUM SERPL-SCNC: 141 MMOL/L (ref 135–145)

## 2025-01-28 PROCEDURE — 97161 PT EVAL LOW COMPLEX 20 MIN: CPT | Mod: GP | Performed by: PHYSICAL THERAPIST

## 2025-01-28 PROCEDURE — 97110 THERAPEUTIC EXERCISES: CPT | Mod: GP | Performed by: PHYSICAL THERAPIST

## 2025-01-28 NOTE — PROGRESS NOTES
PHYSICAL THERAPY EVALUATION  Type of Visit: Evaluation       Fall Risk Screen:  Fall screen completed by: PT  Have you fallen 2 or more times in the past year?: No  Have you fallen and had an injury in the past year?: No  Is patient a fall risk?: No    Subjective     Patient reports he had a stroke on 1/18/25 that took him to the hospital, he had woken up that morning and fell down. He was nauseous and was able to get up and his brother took him to the hospital. He was there 2 days, he had improved.   Continues to have weakness on his left side, walking without cane. He feels he is 60-70% better.          Presenting condition or subjective complaint: left sided weakness due to stroke  Date of onset: 01/18/25    Relevant medical history: Diabetes; High blood pressure   Dates & types of surgery: none    Prior diagnostic imaging/testing results: MRI; CT scan     Prior therapy history for the same diagnosis, illness or injury: No      Prior Level of Function  Transfers: Independent, Assistive equipment  Ambulation: Independent, Assistive equipment  ADL: Independent, Assistive equipment  IADL: driving;     Living Environment  Social support: With family members   Type of home: House; 2-story   Stairs to enter the home: Yes       Ramp: No   Stairs inside the home: Yes 15 Is there a railing: Yes     Help at home: Self Cares (home health aide/personal care attendant, family, etc); Home management tasks (cooking, cleaning); Assist for driving and community activities  Equipment owned: Walker with wheels; Bath bench     Employment: No    Hobbies/Interests: hunting    Patient goals for therapy: get stronger on left side with excercises    Pain assessment: See objective evaluation for additional pain details     Objective      Cognitive Status Examination  Orientation: Oriented to person, place and time   Level of Consciousness: Alert  Follows Commands and Answers Questions: 100% of the time  Personal Safety and Judgement:  "Intact  Memory: Intact    INTEGUMENTARY: Intact  POSTURE: Sitting Posture: Rounded shoulders  RANGE OF MOTION: UE ROM WFL    GAIT:   Level of [place]: Independent, SBA  Assistive Device(s): None, Has a walker and encouraged to use for long distances, discussed a cane  Gait Deviations: Base of support increased  Stance time decreased  Stride length decreased  Vee decreased  Left lateral trunl flexion in stance phase on L side, mild hip drop, noted decreased stance time on L side and short step on R side with initial contact mid foot   Gait Distance: see 6 minute walk test   Stairs: reciprocal with B UE assist on rails     BALANCE:     SPECIAL TESTS  Functional Gait Assessment (FGA) TOTAL SCORE: (MAXIMUM SCORE 30): 20    10 Meter Walk Test (Comfortable)  0.84 m/s  (6 meters in 7.15 seconds)  Age norms: 1.43 m/s   6 Minute Walk Test (6MWT)   1036 ft/315.8 meters   Age norms: 572 meters   Did not require standing rest break   5 Times Sit-to-Stand (5TSTS)  10.9 seconds, arms across chest, standard chair   Age norms 7.7 sec +/-2.6\"     30 Second Sit to Stand (reps/height) 13 reps  Standard chair,   Arms across chest        SENSATION: UE Sensation WNL    Assessment & Plan   CLINICAL IMPRESSIONS  Medical Diagnosis: Left-sided Weakness    Treatment Diagnosis: Left-sided Weakness, Decreased functional mobility and endurance   Impression/Assessment: Patient is a 54 year old male with complaints of left sided weakness following a CVA on 1/18/25. He presented with weakness of the L LE > UE. He falls below age norms in sit to stands, gait speed and 6 minute walk test and considered an increase risk for falls based on FGA and gait speed. Patient has good rehab potential with motivation and participation.  The following significant findings have been identified: Pain, Decreased ROM/flexibility, Decreased strength, Impaired balance, Decreased proprioception, Impaired gait, Impaired muscle performance, and Decreased activity " tolerance. These impairments interfere with their ability to perform self care tasks, work tasks, recreational activities, and community mobility as compared to previous level of function.     Clinical Decision Making (Complexity):  Clinical Presentation: Stable/Uncomplicated  Clinical Presentation Rationale: based on medical and personal factors listed in PT evaluation  Clinical Decision Making (Complexity): Low complexity    PLAN OF CARE  Treatment Interventions:  Interventions: Gait Training, Manual Therapy, Neuromuscular Re-education, Therapeutic Activity, Therapeutic Exercise, Self-Care/Home Management    Long Term Goals     PT Goal 1  Goal Identifier: HEP  Goal Description: Patient will be independent in a HEP for ongoing symptom management in 90 days  Target Date: 04/28/25  PT Goal 2  Goal Identifier: FGA  Goal Description: Pateint will increase score onf FGA to >23/30 for decrease risk for falls and increase in functional mobility in 90 days  Target Date: 04/28/25  PT Goal 3  Goal Identifier: Gait Speed  Goal Description: Patinet will increase gait speed to >1.0m/s for improvement in functional mobility and safety with ambulation in 90 days  Target Date: 04/28/25  PT Goal 4  Goal Identifier: 6 minute walk test  Goal Description: Patient will increase distance on the 6 minute walk test to > 385 meters for improvement in functional mobility and activity toleranced in 90 days  Target Date: 04/28/25      Frequency of Treatment: 1-2 times per week  Duration of Treatment: up to 90 days    Recommended Referrals to Other Professionals:  none currently   Education Assessment:   Learner/Method: Patient;Family;Listening;Demonstration;Pictures/Video    Risks and benefits of evaluation/treatment have been explained.   Patient/Family/caregiver agrees with Plan of Care.     Evaluation Time:     PT Eval, Low Complexity Minutes (23724): 40   Present: Yes: Language: Hmong, ID Number/Identifier: Pineda Bauman  [place] in person       Signing Clinician: Adele Rose, PT

## 2025-01-30 ENCOUNTER — THERAPY VISIT (OUTPATIENT)
Dept: PHYSICAL THERAPY | Facility: REHABILITATION | Age: 55
End: 2025-01-30
Payer: COMMERCIAL

## 2025-01-30 DIAGNOSIS — R53.1 LEFT-SIDED WEAKNESS: ICD-10-CM

## 2025-01-30 DIAGNOSIS — M62.81 GENERALIZED MUSCLE WEAKNESS: Primary | ICD-10-CM

## 2025-01-30 DIAGNOSIS — Z74.09 DECREASED FUNCTIONAL MOBILITY AND ENDURANCE: ICD-10-CM

## 2025-02-05 ENCOUNTER — THERAPY VISIT (OUTPATIENT)
Dept: PHYSICAL THERAPY | Facility: REHABILITATION | Age: 55
End: 2025-02-05
Payer: COMMERCIAL

## 2025-02-05 DIAGNOSIS — R53.1 LEFT-SIDED WEAKNESS: ICD-10-CM

## 2025-02-05 DIAGNOSIS — M62.81 GENERALIZED MUSCLE WEAKNESS: Primary | ICD-10-CM

## 2025-02-05 DIAGNOSIS — Z74.09 DECREASED FUNCTIONAL MOBILITY AND ENDURANCE: ICD-10-CM

## 2025-02-05 PROCEDURE — 97110 THERAPEUTIC EXERCISES: CPT | Mod: GP | Performed by: PHYSICAL THERAPIST

## 2025-02-05 PROCEDURE — 97112 NEUROMUSCULAR REEDUCATION: CPT | Mod: GP | Performed by: PHYSICAL THERAPIST

## 2025-02-11 ENCOUNTER — THERAPY VISIT (OUTPATIENT)
Dept: PHYSICAL THERAPY | Facility: REHABILITATION | Age: 55
End: 2025-02-11
Payer: COMMERCIAL

## 2025-02-11 DIAGNOSIS — R53.1 LEFT-SIDED WEAKNESS: ICD-10-CM

## 2025-02-11 DIAGNOSIS — M62.81 GENERALIZED MUSCLE WEAKNESS: Primary | ICD-10-CM

## 2025-02-11 DIAGNOSIS — Z74.09 DECREASED FUNCTIONAL MOBILITY AND ENDURANCE: ICD-10-CM

## 2025-02-11 PROCEDURE — 97112 NEUROMUSCULAR REEDUCATION: CPT | Mod: GP | Performed by: PHYSICAL THERAPY ASSISTANT

## 2025-02-11 PROCEDURE — 97110 THERAPEUTIC EXERCISES: CPT | Mod: GP | Performed by: PHYSICAL THERAPY ASSISTANT

## 2025-02-13 ENCOUNTER — TRANSFERRED RECORDS (OUTPATIENT)
Dept: HEALTH INFORMATION MANAGEMENT | Facility: CLINIC | Age: 55
End: 2025-02-13

## 2025-02-13 ENCOUNTER — LAB REQUISITION (OUTPATIENT)
Dept: LAB | Facility: CLINIC | Age: 55
End: 2025-02-13
Payer: COMMERCIAL

## 2025-02-13 DIAGNOSIS — E78.5 HYPERLIPIDEMIA, UNSPECIFIED: ICD-10-CM

## 2025-02-13 DIAGNOSIS — E11.65 TYPE 2 DIABETES MELLITUS WITH HYPERGLYCEMIA (H): ICD-10-CM

## 2025-02-13 PROCEDURE — 82465 ASSAY BLD/SERUM CHOLESTEROL: CPT | Mod: ORL | Performed by: FAMILY MEDICINE

## 2025-02-13 PROCEDURE — 84443 ASSAY THYROID STIM HORMONE: CPT | Mod: ORL | Performed by: FAMILY MEDICINE

## 2025-02-13 PROCEDURE — 82043 UR ALBUMIN QUANTITATIVE: CPT | Mod: ORL | Performed by: FAMILY MEDICINE

## 2025-02-13 PROCEDURE — 82570 ASSAY OF URINE CREATININE: CPT | Mod: ORL | Performed by: FAMILY MEDICINE

## 2025-02-13 PROCEDURE — 83721 ASSAY OF BLOOD LIPOPROTEIN: CPT | Mod: ORL | Performed by: FAMILY MEDICINE

## 2025-02-13 PROCEDURE — 80053 COMPREHEN METABOLIC PANEL: CPT | Mod: ORL | Performed by: FAMILY MEDICINE

## 2025-02-15 ENCOUNTER — HEALTH MAINTENANCE LETTER (OUTPATIENT)
Age: 55
End: 2025-02-15

## 2025-02-15 LAB
ALBUMIN SERPL BCG-MCNC: 2.9 G/DL (ref 3.5–5.2)
ALP SERPL-CCNC: 121 U/L (ref 40–150)
ALT SERPL W P-5'-P-CCNC: 36 U/L (ref 0–70)
ANION GAP SERPL CALCULATED.3IONS-SCNC: 13 MMOL/L (ref 7–15)
AST SERPL W P-5'-P-CCNC: 31 U/L (ref 0–45)
BILIRUB SERPL-MCNC: 0.2 MG/DL
BUN SERPL-MCNC: 31 MG/DL (ref 6–20)
CALCIUM SERPL-MCNC: 8.8 MG/DL (ref 8.8–10.4)
CHLORIDE SERPL-SCNC: 105 MMOL/L (ref 98–107)
CHOLEST SERPL-MCNC: 221 MG/DL
CREAT SERPL-MCNC: 1.96 MG/DL (ref 0.67–1.17)
CREAT UR-MCNC: 103 MG/DL
EGFRCR SERPLBLD CKD-EPI 2021: 39 ML/MIN/1.73M2
GLUCOSE SERPL-MCNC: 114 MG/DL (ref 70–99)
HCO3 SERPL-SCNC: 22 MMOL/L (ref 22–29)
LDLC SERPL DIRECT ASSAY-MCNC: 88 MG/DL
MICROALBUMIN UR-MCNC: 2136 MG/L
MICROALBUMIN/CREAT UR: 2073.79 MG/G CR (ref 0–17)
POTASSIUM SERPL-SCNC: 4.2 MMOL/L (ref 3.4–5.3)
PROT SERPL-MCNC: 6.5 G/DL (ref 6.4–8.3)
SODIUM SERPL-SCNC: 140 MMOL/L (ref 135–145)
TSH SERPL DL<=0.005 MIU/L-ACNC: 3.29 UIU/ML (ref 0.3–4.2)

## 2025-02-17 ENCOUNTER — THERAPY VISIT (OUTPATIENT)
Dept: PHYSICAL THERAPY | Facility: REHABILITATION | Age: 55
End: 2025-02-17
Payer: COMMERCIAL

## 2025-02-17 DIAGNOSIS — M62.81 GENERALIZED MUSCLE WEAKNESS: Primary | ICD-10-CM

## 2025-02-17 DIAGNOSIS — R53.1 LEFT-SIDED WEAKNESS: ICD-10-CM

## 2025-02-17 DIAGNOSIS — Z74.09 DECREASED FUNCTIONAL MOBILITY AND ENDURANCE: ICD-10-CM

## 2025-02-17 PROCEDURE — 97112 NEUROMUSCULAR REEDUCATION: CPT | Mod: GP | Performed by: PHYSICAL THERAPIST

## 2025-02-17 PROCEDURE — 97116 GAIT TRAINING THERAPY: CPT | Mod: GP | Performed by: PHYSICAL THERAPIST

## 2025-02-17 PROCEDURE — 97110 THERAPEUTIC EXERCISES: CPT | Mod: GP | Performed by: PHYSICAL THERAPIST

## 2025-02-19 ENCOUNTER — THERAPY VISIT (OUTPATIENT)
Dept: PHYSICAL THERAPY | Facility: REHABILITATION | Age: 55
End: 2025-02-19
Payer: COMMERCIAL

## 2025-02-19 DIAGNOSIS — Z74.09 DECREASED FUNCTIONAL MOBILITY AND ENDURANCE: ICD-10-CM

## 2025-02-19 DIAGNOSIS — M62.81 GENERALIZED MUSCLE WEAKNESS: Primary | ICD-10-CM

## 2025-02-19 DIAGNOSIS — R53.1 LEFT-SIDED WEAKNESS: ICD-10-CM

## 2025-02-19 PROCEDURE — 97110 THERAPEUTIC EXERCISES: CPT | Mod: GP | Performed by: PHYSICAL THERAPY ASSISTANT

## 2025-02-19 PROCEDURE — 97116 GAIT TRAINING THERAPY: CPT | Mod: GP | Performed by: PHYSICAL THERAPY ASSISTANT

## 2025-02-19 PROCEDURE — 97112 NEUROMUSCULAR REEDUCATION: CPT | Mod: GP | Performed by: PHYSICAL THERAPY ASSISTANT

## 2025-02-25 ENCOUNTER — THERAPY VISIT (OUTPATIENT)
Dept: PHYSICAL THERAPY | Facility: REHABILITATION | Age: 55
End: 2025-02-25
Payer: COMMERCIAL

## 2025-02-25 DIAGNOSIS — R53.1 LEFT-SIDED WEAKNESS: Primary | ICD-10-CM

## 2025-02-25 DIAGNOSIS — Z74.09 DECREASED FUNCTIONAL MOBILITY AND ENDURANCE: ICD-10-CM

## 2025-02-25 PROCEDURE — 97112 NEUROMUSCULAR REEDUCATION: CPT | Mod: GP | Performed by: PHYSICAL THERAPIST

## 2025-02-25 PROCEDURE — 97116 GAIT TRAINING THERAPY: CPT | Mod: GP | Performed by: PHYSICAL THERAPIST

## 2025-03-10 ENCOUNTER — TRANSFERRED RECORDS (OUTPATIENT)
Dept: HEALTH INFORMATION MANAGEMENT | Facility: CLINIC | Age: 55
End: 2025-03-10

## 2025-03-10 ENCOUNTER — THERAPY VISIT (OUTPATIENT)
Dept: PHYSICAL THERAPY | Facility: REHABILITATION | Age: 55
End: 2025-03-10
Payer: COMMERCIAL

## 2025-03-10 DIAGNOSIS — Z74.09 DECREASED FUNCTIONAL MOBILITY AND ENDURANCE: ICD-10-CM

## 2025-03-10 DIAGNOSIS — R53.1 LEFT-SIDED WEAKNESS: Primary | ICD-10-CM

## 2025-03-10 NOTE — PROGRESS NOTES
PT Progress Note     03/10/25 0500   Appointment Info   Signing clinician's name / credentials Tony Carter DPT   Visits Used 10   Medical Diagnosis Left-sided Weakness   PT Tx Diagnosis Left-sided Weakness, Decreased functional mobility and endurance   Progress Note/Certification   Start of Care Date 01/28/25   Onset of illness/injury or Date of Surgery 01/18/25   Therapy Frequency 1-2 times per week   Predicted Duration up to 90 days   Certification date from 01/28/25   Certification date to 04/28/25   Progress Note Completed Date 01/28/25   [facility] Authorization Information   Condition type Initial onset (within last 3 months)   Cause of current episode Unspecified   Nature of treatment Rehabilitative   Documented POC (choose all that apply) Measurable short and long term/discharge treatment goals related to physical and functional deficits.;Frequency of treatment visits and treatment activities to address deficit areas.;Patient agrees to program participation including home program   How did the symptoms start with an acute CVA on 1/18/25   General health reported by patient Good   Supervision   PT Assistant Visit Number 3       Present No    ID or First/Last Name declined  today   GOALS   PT Goals 2;3;4   PT Goal 1   Goal Identifier HEP   Goal Description Patient will be independent in a HEP for ongoing symptom management in 90 days   Target Date 04/28/25   PT Goal 2   Goal Identifier FGA   Goal Description Patient will increase score onf FGA to >23/30 for decrease risk for falls and increase in functional mobility in 90 days   Goal Progress Goal met, 25/30   Target Date 04/28/25   Date Met 02/25/25   PT Goal 3   Goal Identifier Gait Speed   Goal Description Patinet will increase gait speed to >1.0m/s for improvement in functional mobility and safety with ambulation in 90 days   Rationale to maximize safety and independence within the community   Goal Progress 1.21  m/s achieved today   Date Met 03/10/25   PT Goal 4   Goal Identifier 6 minute walk test   Goal Description Patient will increase distance on the 6 minute walk test to > 385 meters for improvement in functional mobility and activity toleranced in 90 days   Goal Progress Goal progressing, 375m as of 2/25/25   Target Date 04/28/25   Subjective Report   Subjective Report Client reports that he LLE is 90% better. Continues to feel weakness of the LLE on stairs. Pt accompanied today by his son. Pt is pleasant & cooperative   Objective Measures   Objective Measures Objective Measure 1;Objective Measure 2   Therapeutic Procedure/Exercise   Therapeutic Procedures: strength, endurance, ROM, flexibility minutes (29962) 20   Ther Proc 1 NuStep WL 6 B LEs only x 7 min   Ther Proc 2 2 x 10 rep standing hip abd   Ther Proc 2 - Details t/c cues for reduced [place]   Ther Proc 3 2 x 10 8'' stepups   Skilled Intervention excercise progression to assist in functional mobility, cues for ideal neuromuscular recruitment & joint isolation   Ther Proc 3 - Details cues for neutral spine alignment   Neuromuscular Re-education   Neuromuscular re-ed of mvmt, balance, coord, kinesthetic sense, posture, proprioception minutes (42378) 25   Neuro Re-ed 1 Tandem stance 2 x 30'' eyes open   Neuro Re-ed 1 - Details excellent stability   Neuro Re-ed 2 SLS 1 x 30''   Neuro Re-ed 2 - Details cues for improved posture   PTRx Neuro Re-ed 1 1 x 10 single leg bridge   PTRx Neuro Re-ed 1 - Details v/cs for neutral pelvis alignment   PTRx Neuro Re-ed 2 1 x 10 B 4''stepups   Skilled Intervention facilitated LLE activation upon eccentric & concentric movements through the choice of exercise   Patient Response/Progress Pt reports reduced LLE strength   Education   Learner/Method Patient;Family;Listening;Demonstration;Pictures/Video   Plan   Home program sit to stands, walking and bike at home   Updates to plan of care Reviewed standing & supine HEP   Plan  "for next session Advance to high level balance, agility activities & reassess outcome measures at future visits.   Comments   Comments Pt's primary concern is when he will be able to drive independently again. Pt remains appropriate to continue physical therapy.   Total Session Time   Timed Code Treatment Minutes 45   Total Treatment Time (sum of timed and untimed services) 45         PLAN  Pt is making excellent progress towards his rehab goals. \"The leg is 90% better\". Pt continues to require cues for HEP performance, and the FGA goal was recently met. The 6MTW goal has not been met. Pt met the gait speed goal today with a speed of 1.21 m/s. 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 Pt's primary complaint is that he wants to be able to drive independently again. Pt remains appropriate to continue skilled PT services.       Beginning/End Dates of Progress Note Reporting Period:  01/28/25 to 03/10/2025    Referring Provider:   Julio Ceja    "

## 2025-03-13 ENCOUNTER — ORDERS ONLY (AUTO-RELEASED) (OUTPATIENT)
Dept: CARDIOLOGY | Facility: CLINIC | Age: 55
End: 2025-03-13
Payer: COMMERCIAL

## 2025-03-13 DIAGNOSIS — I69.359 HEMIPARESIS DUE TO OLD EMBOLIC STROKE (H): ICD-10-CM

## 2025-03-13 DIAGNOSIS — I69.359 HEMIPARESIS DUE TO OLD EMBOLIC STROKE (H): Primary | ICD-10-CM

## 2025-03-18 ENCOUNTER — HOSPITAL ENCOUNTER (OUTPATIENT)
Dept: CARDIOLOGY | Facility: HOSPITAL | Age: 55
Discharge: HOME | End: 2025-03-18
Attending: INTERNAL MEDICINE
Payer: COMMERCIAL

## 2025-03-18 ENCOUNTER — THERAPY VISIT (OUTPATIENT)
Dept: PHYSICAL THERAPY | Facility: REHABILITATION | Age: 55
End: 2025-03-18
Payer: COMMERCIAL

## 2025-03-18 DIAGNOSIS — R53.1 LEFT-SIDED WEAKNESS: Primary | ICD-10-CM

## 2025-03-18 DIAGNOSIS — I63.9 ACUTE CVA (CEREBROVASCULAR ACCIDENT) (H): ICD-10-CM

## 2025-03-18 PROCEDURE — 97110 THERAPEUTIC EXERCISES: CPT | Mod: GP

## 2025-03-18 PROCEDURE — 93270 REMOTE 30 DAY ECG REV/REPORT: CPT

## 2025-03-18 PROCEDURE — 97112 NEUROMUSCULAR REEDUCATION: CPT | Mod: GP

## 2025-03-25 ENCOUNTER — THERAPY VISIT (OUTPATIENT)
Dept: PHYSICAL THERAPY | Facility: REHABILITATION | Age: 55
End: 2025-03-25
Payer: COMMERCIAL

## 2025-03-25 DIAGNOSIS — R53.1 LEFT-SIDED WEAKNESS: Primary | ICD-10-CM

## 2025-03-25 DIAGNOSIS — M62.81 GENERALIZED MUSCLE WEAKNESS: ICD-10-CM

## 2025-03-25 DIAGNOSIS — Z74.09 DECREASED FUNCTIONAL MOBILITY AND ENDURANCE: ICD-10-CM

## 2025-03-25 PROCEDURE — 97750 PHYSICAL PERFORMANCE TEST: CPT | Mod: GP | Performed by: PHYSICAL THERAPIST

## 2025-03-25 PROCEDURE — 97110 THERAPEUTIC EXERCISES: CPT | Mod: GP | Performed by: PHYSICAL THERAPIST

## 2025-03-25 ASSESSMENT — 6 MINUTE WALK TEST (6MWT)
OXYGEN DEVICE: NO
TREATMENT DETAIL/SKILLED INTERVENTION: SEE ABOVE
PROGRESS: SEE ABOVE
TOTAL DISTANCE WALKED (METERS): 437

## 2025-03-25 ASSESSMENT — 10 METER WALK TEST (10METWT): AVERAGE TIME - SECONDS: 1.5

## 2025-04-02 ENCOUNTER — LAB REQUISITION (OUTPATIENT)
Dept: LAB | Facility: CLINIC | Age: 55
End: 2025-04-02
Payer: COMMERCIAL

## 2025-04-02 DIAGNOSIS — I12.9 HYPERTENSIVE CHRONIC KIDNEY DISEASE WITH STAGE 1 THROUGH STAGE 4 CHRONIC KIDNEY DISEASE, OR UNSPECIFIED CHRONIC KIDNEY DISEASE: ICD-10-CM

## 2025-04-02 LAB
ANION GAP SERPL CALCULATED.3IONS-SCNC: 10 MMOL/L (ref 7–15)
BUN SERPL-MCNC: 39.7 MG/DL (ref 6–20)
CALCIUM SERPL-MCNC: 8.7 MG/DL (ref 8.8–10.4)
CHLORIDE SERPL-SCNC: 108 MMOL/L (ref 98–107)
CREAT SERPL-MCNC: 2.42 MG/DL (ref 0.67–1.17)
EGFRCR SERPLBLD CKD-EPI 2021: 30 ML/MIN/1.73M2
GLUCOSE SERPL-MCNC: 265 MG/DL (ref 70–99)
HCO3 SERPL-SCNC: 21 MMOL/L (ref 22–29)
POTASSIUM SERPL-SCNC: 4.3 MMOL/L (ref 3.4–5.3)
SODIUM SERPL-SCNC: 139 MMOL/L (ref 135–145)

## 2025-04-02 PROCEDURE — 80048 BASIC METABOLIC PNL TOTAL CA: CPT | Mod: ORL | Performed by: FAMILY MEDICINE

## 2025-04-04 ENCOUNTER — LAB REQUISITION (OUTPATIENT)
Dept: LAB | Facility: CLINIC | Age: 55
End: 2025-04-04
Payer: COMMERCIAL

## 2025-04-04 ENCOUNTER — TRANSFERRED RECORDS (OUTPATIENT)
Dept: HEALTH INFORMATION MANAGEMENT | Facility: CLINIC | Age: 55
End: 2025-04-04

## 2025-04-04 DIAGNOSIS — E11.65 TYPE 2 DIABETES MELLITUS WITH HYPERGLYCEMIA (H): ICD-10-CM

## 2025-04-04 LAB
CHOLEST SERPL-MCNC: 190 MG/DL
FASTING STATUS PATIENT QL REPORTED: ABNORMAL
HDLC SERPL-MCNC: 54 MG/DL
LDLC SERPL CALC-MCNC: 105 MG/DL
NONHDLC SERPL-MCNC: 136 MG/DL
TRIGL SERPL-MCNC: 155 MG/DL

## 2025-04-04 PROCEDURE — 80061 LIPID PANEL: CPT | Mod: ORL | Performed by: FAMILY MEDICINE

## 2025-04-08 ENCOUNTER — THERAPY VISIT (OUTPATIENT)
Dept: OCCUPATIONAL THERAPY | Facility: REHABILITATION | Age: 55
End: 2025-04-08
Payer: COMMERCIAL

## 2025-04-08 DIAGNOSIS — I63.9 ACUTE CVA (CEREBROVASCULAR ACCIDENT) (H): Primary | ICD-10-CM

## 2025-04-08 PROCEDURE — 97112 NEUROMUSCULAR REEDUCATION: CPT | Mod: GO | Performed by: OCCUPATIONAL THERAPY ASSISTANT

## 2025-04-08 PROCEDURE — 97110 THERAPEUTIC EXERCISES: CPT | Mod: GO | Performed by: OCCUPATIONAL THERAPY ASSISTANT

## 2025-04-15 ENCOUNTER — TRANSFERRED RECORDS (OUTPATIENT)
Dept: HEALTH INFORMATION MANAGEMENT | Facility: CLINIC | Age: 55
End: 2025-04-15
Payer: COMMERCIAL

## 2025-04-17 ENCOUNTER — THERAPY VISIT (OUTPATIENT)
Dept: OCCUPATIONAL THERAPY | Facility: REHABILITATION | Age: 55
End: 2025-04-17
Payer: COMMERCIAL

## 2025-04-17 DIAGNOSIS — I63.9 ACUTE CVA (CEREBROVASCULAR ACCIDENT) (H): Primary | ICD-10-CM

## 2025-04-24 ENCOUNTER — THERAPY VISIT (OUTPATIENT)
Dept: OCCUPATIONAL THERAPY | Facility: REHABILITATION | Age: 55
End: 2025-04-24
Payer: COMMERCIAL

## 2025-04-24 DIAGNOSIS — R29.898 LEFT HAND WEAKNESS: Primary | ICD-10-CM

## 2025-04-24 DIAGNOSIS — I63.9 ACUTE CVA (CEREBROVASCULAR ACCIDENT) (H): ICD-10-CM

## 2025-04-24 DIAGNOSIS — R27.9 INCOORDINATION DUE TO ACUTE STROKE (H): ICD-10-CM

## 2025-04-24 DIAGNOSIS — I63.9 INCOORDINATION DUE TO ACUTE STROKE (H): ICD-10-CM

## 2025-04-24 DIAGNOSIS — Z78.9 IMPAIRED INSTRUMENTAL ACTIVITIES OF DAILY LIVING: ICD-10-CM

## 2025-04-25 ENCOUNTER — MEDICAL CORRESPONDENCE (OUTPATIENT)
Dept: HEALTH INFORMATION MANAGEMENT | Facility: CLINIC | Age: 55
End: 2025-04-25
Payer: COMMERCIAL

## 2025-04-26 ENCOUNTER — HEALTH MAINTENANCE LETTER (OUTPATIENT)
Age: 55
End: 2025-04-26

## 2025-04-28 DIAGNOSIS — Z86.73 HISTORY OF STROKE: Primary | ICD-10-CM

## 2025-04-29 ENCOUNTER — TELEPHONE (OUTPATIENT)
Dept: CARDIOLOGY | Facility: CLINIC | Age: 55
End: 2025-04-29

## 2025-04-29 NOTE — TELEPHONE ENCOUNTER
1st attempt- Left voicemail for the patient to call back and schedule the following:    Appointment type:  New EP  Provider:  ANY EP MD  Return date:  Next Available  Additional appointment(s) needed:  No  Additional Notes:  No  Specialty phone number: [phone]

## 2025-04-30 ENCOUNTER — PATIENT OUTREACH (OUTPATIENT)
Dept: CARE COORDINATION | Facility: CLINIC | Age: 55
End: 2025-04-30
Payer: COMMERCIAL

## 2025-05-02 ENCOUNTER — APPOINTMENT (OUTPATIENT)
Dept: INTERPRETER SERVICES | Facility: CLINIC | Age: 55
End: 2025-05-02
Payer: COMMERCIAL

## 2025-05-05 ENCOUNTER — OFFICE VISIT (OUTPATIENT)
Dept: CARDIOLOGY | Facility: CLINIC | Age: 55
End: 2025-05-05
Payer: COMMERCIAL

## 2025-05-05 VITALS
HEIGHT: 63 IN | RESPIRATION RATE: 14 BRPM | SYSTOLIC BLOOD PRESSURE: 138 MMHG | BODY MASS INDEX: 23.57 KG/M2 | WEIGHT: 133 LBS | DIASTOLIC BLOOD PRESSURE: 62 MMHG | HEART RATE: 74 BPM

## 2025-05-05 DIAGNOSIS — Z86.73 HISTORY OF STROKE: Primary | ICD-10-CM

## 2025-05-05 PROCEDURE — 3075F SYST BP GE 130 - 139MM HG: CPT | Performed by: STUDENT IN AN ORGANIZED HEALTH CARE EDUCATION/TRAINING PROGRAM

## 2025-05-05 PROCEDURE — 99204 OFFICE O/P NEW MOD 45 MIN: CPT | Performed by: STUDENT IN AN ORGANIZED HEALTH CARE EDUCATION/TRAINING PROGRAM

## 2025-05-05 PROCEDURE — 3078F DIAST BP <80 MM HG: CPT | Performed by: STUDENT IN AN ORGANIZED HEALTH CARE EDUCATION/TRAINING PROGRAM

## 2025-05-05 PROCEDURE — G2211 COMPLEX E/M VISIT ADD ON: HCPCS | Performed by: STUDENT IN AN ORGANIZED HEALTH CARE EDUCATION/TRAINING PROGRAM

## 2025-05-05 RX ORDER — EMPAGLIFLOZIN 25 MG/1
1 TABLET, FILM COATED ORAL
COMMUNITY
Start: 2025-04-18

## 2025-05-05 RX ORDER — ASPIRIN 81 MG/1
81 TABLET ORAL DAILY
COMMUNITY

## 2025-05-05 RX ORDER — ACYCLOVIR 400 MG/1
TABLET ORAL
COMMUNITY
Start: 2025-02-13

## 2025-05-05 RX ORDER — EZETIMIBE 10 MG/1
10 TABLET ORAL DAILY
Qty: 90 TABLET | Refills: 3 | Status: SHIPPED | OUTPATIENT
Start: 2025-05-05

## 2025-05-05 RX ORDER — AMLODIPINE BESYLATE 10 MG/1
10 TABLET ORAL DAILY
COMMUNITY
Start: 2025-03-10 | End: 2026-03-10

## 2025-05-05 NOTE — LETTER
5/5/2025    Azeem Joshi MD  [address] [place] [address]    RE: Pérez Dumont       Dear Colleague,     I had the pleasure of seeing Pérez Dumont in the [facility].  Cardiology Clinic    Pérez Dumont is a 55 year old with history of hypertension, type 2 diabetes, CKD, and recent stroke January 2025 who presents to [facility] care.    Mr. Dumont had a stroke in January.  He had an event monitor after this for 30 days that did not show atrial fibrillation or any other arrhythmias.  He then had a follow-up MRI which showed multiple new cardioembolic strokes in May.  He presents today for evaluation of this.  He denies any chest pain, shortness of breath, palpitations, syncope, or presyncope.  He does feel somewhat more fatigued over the last few weeks.  He has been taking all of his medications as prescribed.    General: Well appearing, appears stated age.  CV: Normal rate and rhythm. No m/r/g. No JVD.   Pulm: Normal effort. Normal breath sounds.  Lower extremities: No lower extremity edema.    Labs:   Reviewed in epic.  Creatinine 2.42    Hemoglobin 13  Platelets 253  A1c 8.2%    Testing:  Zio patch February 27, 2025: No atrial fibrillation, no ventricular or supraventricular tachycardia (my interpretation)  TTE January 2025: EF 60 to 65%, asymmetric septal hypertrophy, negative bubble study (my interpretation)  MRI May 2025: MRI with cardioembolism    Assessment and Plan:    1.  Possible cardioembolic stroke.  30-day event monitor without arrhythmia.  No worrisome symptoms for A-fib.  Discussed a implantable monitor versus another Zio patch.  The patient would like to try another Zio patch first.  I think this is reasonable.  If no arrhythmia on this, we will pursue implanted loop recorder with the implication being full anticoagulation with Eliquis.    2.  Hypertension.  Above goal today, however has been good at home.  No changes today.    The longitudinal plan of care for  the diagnosis(es)/condition(s) as documented were addressed during this visit. Due to the added complexity in care, I will continue to support Doua in the subsequent management and with ongoing continuity of care.    Return in about 3 months (around 8/5/2025).    Lico Patricia MD  Interventional Cardiology      Thank you for allowing me to participate in the care of your patient.      Sincerely,     Lico Patricia MD     [facility]  cc:   EZEQUIEL Ross [facility]  [address] [place] [address], [address]  [place] [address]

## 2025-05-05 NOTE — PATIENT INSTRUCTIONS
Another heart monitor, if that does not catch it, will do the implanted monitor.    In the meantime, start zetia 10 mg daily for cholesterol.    Goal blood pressure <130/80 mmHg.    Goal LDL cholesterol <55 mg/dL, yours was 100.

## 2025-05-05 NOTE — PROGRESS NOTES
Cardiology Clinic    Pérez Dumont is a 55 year old with history of hypertension, type 2 diabetes, CKD, and recent stroke January 2025 who [facility].    Mr. Dumont had a stroke in January.  He had an event monitor after this for 30 days that did not show atrial fibrillation or any other arrhythmias.  He then had a follow-up MRI which showed multiple new cardioembolic strokes in May.  He presents today for evaluation of this.  He denies any chest pain, shortness of breath, palpitations, syncope, or presyncope.  He does feel somewhat more fatigued over the last few weeks.  He has been taking all of his medications as prescribed.    General: Well appearing, appears stated age.  CV: Normal rate and rhythm. No m/r/g. No JVD.   Pulm: Normal effort. Normal breath sounds.  Lower extremities: No lower extremity edema.    Labs:   Reviewed in epic.  Creatinine 2.42    Hemoglobin 13  Platelets 253  A1c 8.2%    Testing:  Zio patch February 27, 2025: No atrial fibrillation, no ventricular or supraventricular tachycardia (my interpretation)  TTE January 2025: EF 60 to 65%, asymmetric septal hypertrophy, negative bubble study (my interpretation)  MRI May 2025: MRI with cardioembolism    Assessment and Plan:    1.  Possible cardioembolic stroke.  30-day event monitor without arrhythmia.  No worrisome symptoms for A-fib.  Discussed a implantable monitor versus another Zio patch.  The patient would like to try another Zio patch first.  I think this is reasonable.  If no arrhythmia on this, we will pursue implanted loop recorder with the implication being full anticoagulation with Eliquis.    2.  Hypertension.  Above goal today, however has been good at home.  No changes today.    The longitudinal plan of care for the diagnosis(es)/condition(s) as documented were addressed during this visit. Due to the added complexity in care, I will continue to support Pérez in the subsequent management and with ongoing continuity  of care.    Return in about 3 months (around 8/5/2025).    Lico Patricia MD  Interventional Cardiology

## 2025-05-06 ENCOUNTER — THERAPY VISIT (OUTPATIENT)
Dept: OCCUPATIONAL THERAPY | Facility: REHABILITATION | Age: 55
End: 2025-05-06
Payer: COMMERCIAL

## 2025-05-06 DIAGNOSIS — I63.9 INCOORDINATION DUE TO ACUTE STROKE (H): ICD-10-CM

## 2025-05-06 DIAGNOSIS — Z78.9 IMPAIRED INSTRUMENTAL ACTIVITIES OF DAILY LIVING: ICD-10-CM

## 2025-05-06 DIAGNOSIS — R29.898 LEFT HAND WEAKNESS: Primary | ICD-10-CM

## 2025-05-06 DIAGNOSIS — I63.9 ACUTE CVA (CEREBROVASCULAR ACCIDENT) (H): ICD-10-CM

## 2025-05-06 DIAGNOSIS — R27.9 INCOORDINATION DUE TO ACUTE STROKE (H): ICD-10-CM

## 2025-05-06 PROCEDURE — 97530 THERAPEUTIC ACTIVITIES: CPT | Mod: GO | Performed by: OCCUPATIONAL THERAPIST

## 2025-06-17 ENCOUNTER — HOSPITAL ENCOUNTER (OUTPATIENT)
Dept: ULTRASOUND IMAGING | Facility: HOSPITAL | Age: 55
Discharge: HOME | End: 2025-06-17
Attending: INTERNAL MEDICINE
Payer: COMMERCIAL

## 2025-06-17 DIAGNOSIS — E11.22 TYPE 2 DIABETES MELLITUS WITH DIABETIC CHRONIC KIDNEY DISEASE (H): ICD-10-CM

## 2025-06-17 DIAGNOSIS — I15.0 RENOVASCULAR HYPERTENSION: ICD-10-CM

## 2025-06-17 DIAGNOSIS — N18.32 CHRONIC KIDNEY DISEASE, STAGE 3B (H): ICD-10-CM

## 2025-06-17 PROCEDURE — 76770 US EXAM ABDO BACK WALL COMP: CPT

## 2025-06-25 ENCOUNTER — RESULTS FOLLOW-UP (OUTPATIENT)
Dept: CARDIOLOGY | Facility: CLINIC | Age: 55
End: 2025-06-25

## 2025-06-26 ENCOUNTER — TELEPHONE (OUTPATIENT)
Dept: CARDIOLOGY | Facility: CLINIC | Age: 55
End: 2025-06-26
Payer: COMMERCIAL

## 2025-06-26 NOTE — TELEPHONE ENCOUNTER
Dr. Schrader, can you please review this patient's chart for ILR implant per Dr. Patricia?  Indication is CVA with normal 30 day monitor and recently another normal 14 day monitor.   Pt is on Plavix and Aspirin 81 mg.      Ok to proceed?  Thank you!  Chloé

## 2025-06-26 NOTE — TELEPHONE ENCOUNTER
----- Message from Adelia JOY sent at 6/26/2025  9:40 AM CDT -----  Regarding: ILR  Hello EP team,     LYNETTE has recommended that this pt get an ILR based on recent zio patch results. Pt's daughter is the primary number and we have consent to communicate on file, she is aware of these results and recommendations and is agreeable with moving forward. Can you please help arrange?     Thanks much,     PRIYANK Delvalle

## 2025-07-01 ENCOUNTER — PREP FOR PROCEDURE (OUTPATIENT)
Dept: CARDIOLOGY | Facility: CLINIC | Age: 55
End: 2025-07-01
Payer: COMMERCIAL

## 2025-07-01 ENCOUNTER — DOCUMENTATION ONLY (OUTPATIENT)
Dept: CARDIOLOGY | Facility: CLINIC | Age: 55
End: 2025-07-01
Payer: COMMERCIAL

## 2025-07-01 DIAGNOSIS — Z86.73 HISTORY OF STROKE: ICD-10-CM

## 2025-07-01 DIAGNOSIS — I63.9 ACUTE CVA (CEREBROVASCULAR ACCIDENT) (H): Primary | ICD-10-CM

## 2025-07-01 RX ORDER — LIDOCAINE 40 MG/G
CREAM TOPICAL
OUTPATIENT
Start: 2025-07-01

## 2025-07-01 RX ORDER — SODIUM CHLORIDE 9 MG/ML
100 INJECTION, SOLUTION INTRAVENOUS CONTINUOUS
OUTPATIENT
Start: 2025-07-01

## 2025-07-01 NOTE — PROGRESS NOTES
AC: None- NA No med hold for ILR     Sarah Schrader MD to Me (Selected Message)  AW      6/30/25  6:30 PM  Thanks - can proceed with ILR, [employer].     Heather Dumotn 1970 [id number]  Home:[phone] (home) Cell:[phone] (mobile)  Emergency Contact: Binh Avery [phone]  PCP: Azeem Joshi, [phone]      Important patient information for CSC/Cath Lab staff : Patient needs  [facility] EP Cath Lab Procedure Order     Device Implant/Revision:  Procedure: ILR Implant  Current Device/Device Co Needed for Procedure: Skoodat   Ordering Provider: Dr Schrader (Can be scheduled with any provider)  Date Ordered and Prepped: 7/1/2025 Estephania Castillo RN  Diagnosis:  CVA/Stroke  Scheduling Timeframe:  For implants onlyt, schedule out at least 3 weeks for insurance approval for implant  Scheduling Restrictions: None  Scheduling Contact: Please contact pt to schedule, if you are unable to schedule date within the next 24 hours please contact pt to update on scheduling process  Cardiology Follow Up Apt s/p:  Standard Device follow up General Card @ 6mo (does not include New CRT/CSP/HIS)  Pre-Procedural Testing needed: None  Anesthesia:  None, No sedation only local anesthetic.    [facility] EP Cath Lab Prep   H&P:  H&P is not needed for this procedure  NPO: Pt does NOT need to be NPO prior to procedure, pt can eat and drink up to procedure.  Pre-Procedure Labs/T&S: No labs required prior to procedure or AM of procedure.  Medical Records Pertinent for Procedure: None  Iodinated Contrast Dye Allergies (Does not include Shellfish, Egg, and/or Iodine Allergy)- excludes ILR/SICD: NA for procedure, pt does not receive dye for procedure.  Lidocaine and/or Chlorhexidine Allergies (scheduling to include alert on snapboard): None  Renal Protocol (GFR < 40ml/min, IV contrast past 2 days, EF < or = 25%)- excludes ILR/SICD: NA for procedure.  Medications: NO medication holds  prior to procedure, pt to take all AM medications (this is including all diabetic medication, multivitamins/supplements diuretics, and GLP-1 medications)    Allergies   Allergen Reactions    Crab Extract      Can't eat crab    Shellfish-Derived Products Hives       Current Outpatient Medications:     acetaminophen (TYLENOL) 325 MG tablet, Take 2 tablets (650 mg) by mouth every 4 hours as needed for mild pain., Disp: , Rfl:     amLODIPine (NORVASC) 10 MG tablet, Take 10 mg by mouth daily., Disp: , Rfl:     aspirin 81 MG EC tablet, Take 81 mg by mouth daily., Disp: , Rfl:     atorvastatin (LIPITOR) 80 MG tablet, Take 1 tablet (80 mg) by mouth every evening., Disp: 30 tablet, Rfl: 1    blood glucose (NO BRAND SPECIFIED) lancets standard, Use to test blood sugar 3 times daily or as directed. (Patient not taking: Reported on 5/5/2025), Disp: 200 Lancet, Rfl: 1    blood glucose (NO BRAND SPECIFIED) test strip, Use to test blood sugar 3 times daily or as directed. (Patient not taking: Reported on 5/5/2025), Disp: 200 strip, Rfl: 1    blood glucose monitoring (NO BRAND SPECIFIED) meter device kit, Use to test blood sugar 3 times daily or as directed. (Patient not taking: Reported on 5/5/2025), Disp: 1 kit, Rfl: 0    clopidogrel (PLAVIX) 75 MG tablet, Take 1 tablet (75 mg) by mouth daily., Disp: 90 tablet, Rfl: 0    Continuous Glucose  (DEXCOM G7 ) RACHELLE, USE AS DIRECTED PER  INSTRUCTIONS TO MONITOR SUGARS 90 DAYS, Disp: , Rfl:     Continuous Glucose Sensor (DEXCOM G7 SENSOR) MISC, USE AS DIRECTED PER  INSTRUCTIONS TO MONITOR BLOOD GLUCOSE. CHANGE EVERY 10 DAYS, Disp: , Rfl:     ezetimibe (ZETIA) 10 MG tablet, Take 1 tablet (10 mg) by mouth daily., Disp: 90 tablet, Rfl: 3    insulin glargine (LANTUS PEN) 100 UNIT/ML pen, Inject 10 Units subcutaneously at bedtime. (Patient not taking: Reported on 5/5/2025), Disp: 15 mL, Rfl: 1    insulin lispro (HUMALOG KWIKPEN) 100 UNIT/ML (1 unit dial)  KWIKPEN, Inject 5 Units subcutaneously 3 times daily (before meals). (Patient not taking: Reported on 5/5/2025), Disp: 15 mL, Rfl: 1    insulin pen needle (31G X 8 MM) 31G X 8 MM miscellaneous, 1 Box of 100 insulin pen needles to be dispensed with every insulin pen prescription. Ok to substitute at pharmacy discretion (Patient not taking: Reported on 5/5/2025), Disp: 100 each, Rfl: 0    JARDIANCE 25 MG TABS tablet, Take 1 tablet by mouth daily at 2 pm., Disp: , Rfl:     losartan (COZAAR) 50 MG tablet, Take 1 tablet (50 mg) by mouth daily. (Patient not taking: Reported on 5/5/2025), Disp: 30 tablet, Rfl: 0    semaglutide (OZEMPIC) 2 MG/3ML pen, Inject 0.25 mg subcutaneously every 7 days., Disp: , Rfl:     Documentation Date:7/1/2025 1:36 PM  Estephania Castillo RN

## 2025-07-03 ENCOUNTER — TELEPHONE (OUTPATIENT)
Dept: CARDIOLOGY | Facility: CLINIC | Age: 55
End: 2025-07-03
Payer: COMMERCIAL

## 2025-07-03 NOTE — TELEPHONE ENCOUNTER
Pre-Procedure Education    Procedure: ILR Implant with Irena Chairez NP on 7/24/2025 with arrival time 6:30 am    Orders: Orderset for procedure verified signed/held    COVID: COVID policy- if pt develops COVID like symptoms prior to procedure, he/she would need to complete an at home with a rapid antigen COVID test 1-2 days prior to your procedure date. If COVID + pt is aware the procedure will need to be rescheduled, and to contact CV scheduling as soon as possible    Pre-Op H&P: H&P is not needed for this procedure.    Education:   Contact: Reviewed via phone with pt and spouse/caregiver  Pre-Procedure Instruction: Pt can eat and drink normally prior to procedure- does NOT need to be NPO, Remove all jewelry and leave all valuables at home, Shower prior to arrival, Discussed plan for local anesthetic-No sedation, Pt is allowed to drive to and from procedure, Post-procedure follow up process, Post-procedure restrictions/expectations, and Pre-procedure letter sent- letter tab  Risks:  Implantable Loop Recorder Insertion  < 1% for Infection  < 1% Bleeding, Hematoma (increased with anticoagulation therapy)   < 1% generator malfunction     Medication:   Instructions regarding anticoagulants: Pt not on AC- N/A  Instructions for all medications given to pt: Take all medication AM of procedure with small sips of water, including if pt is on any DM of GLP-1 medications.    Important patient information for staff: Needs Ana     7/3/2025 3:33 PM  Mona Palomo RN

## 2025-07-03 NOTE — TELEPHONE ENCOUNTER
[facility] Call Center    Phone Message    May a detailed message be left on voicemail: yes     Reason for Call: Other: Per call from patient's daughter, Izabella, patient needs prep instructions for upcoming procedure on 7/24/25. Please call her back. Thanks     Action Taken: Other: Cardio    Travel Screening: Not Applicable     Date of Service:

## 2025-07-24 ENCOUNTER — HOSPITAL ENCOUNTER (OUTPATIENT)
Facility: HOSPITAL | Age: 55
Discharge: HOME | End: 2025-07-24
Attending: NURSE PRACTITIONER | Admitting: INTERNAL MEDICINE
Payer: COMMERCIAL

## 2025-07-24 VITALS
DIASTOLIC BLOOD PRESSURE: 77 MMHG | RESPIRATION RATE: 19 BRPM | HEART RATE: 78 BPM | OXYGEN SATURATION: 96 % | TEMPERATURE: 98 F | SYSTOLIC BLOOD PRESSURE: 160 MMHG

## 2025-07-24 DIAGNOSIS — Z98.890 HISTORY OF LOOP RECORDER: Primary | ICD-10-CM

## 2025-07-24 DIAGNOSIS — Z86.73 HISTORY OF STROKE: ICD-10-CM

## 2025-07-24 DIAGNOSIS — I63.9 ACUTE CVA (CEREBROVASCULAR ACCIDENT) (H): ICD-10-CM

## 2025-07-24 LAB — GLUCOSE BLDC GLUCOMTR-MCNC: 234 MG/DL (ref 70–99)

## 2025-07-24 PROCEDURE — 82962 GLUCOSE BLOOD TEST: CPT

## 2025-07-24 PROCEDURE — 250N000011 HC RX IP 250 OP 636: Performed by: NURSE PRACTITIONER

## 2025-07-24 PROCEDURE — C1764 EVENT RECORDER, CARDIAC: HCPCS | Performed by: NURSE PRACTITIONER

## 2025-07-24 PROCEDURE — 33285 INSJ SUBQ CAR RHYTHM MNTR: CPT | Performed by: NURSE PRACTITIONER

## 2025-07-24 DEVICE — MONITOR CARDIAC LUX-DX II+ M312: Type: IMPLANTABLE DEVICE | Site: CHEST | Status: FUNCTIONAL

## 2025-07-24 RX ORDER — SODIUM CHLORIDE 9 MG/ML
100 INJECTION, SOLUTION INTRAVENOUS CONTINUOUS
Status: DISCONTINUED | OUTPATIENT
Start: 2025-07-24 | End: 2025-07-24 | Stop reason: HOSPADM

## 2025-07-24 RX ORDER — LIDOCAINE 40 MG/G
CREAM TOPICAL
Status: DISCONTINUED | OUTPATIENT
Start: 2025-07-24 | End: 2025-07-24 | Stop reason: HOSPADM

## 2025-07-24 RX ORDER — LIDOCAINE HYDROCHLORIDE AND EPINEPHRINE 10; 10 MG/ML; UG/ML
INJECTION, SOLUTION INFILTRATION; PERINEURAL
Status: DISCONTINUED | OUTPATIENT
Start: 2025-07-24 | End: 2025-07-24 | Stop reason: HOSPADM

## 2025-07-24 ASSESSMENT — ACTIVITIES OF DAILY LIVING (ADL)
ADLS_ACUITY_SCORE: 51
ADLS_ACUITY_SCORE: 51

## 2025-07-24 NOTE — LETTER
[facility]  [address] [place] [id number]  Phone: [phone]  Fax: [phone]    July 24, 2025        Pérez Dumont  [address] [address]          To whom it may concern:    RE: Pérez Dumont    Patient was seen and treated today at our clinic and missed work. He may return to work on Friday, July 25th, 2025 with the following restrictions: Left arm cannot raised  over the head or behind the back, perform any vigorous arm movements with the left arm, pushing or pulling, or lift greater than 10 lbs for 1 week from procedure date of 7/24/2025. Pérez can return to work, if capable of follow the restrictions listed above and in accordance to his job duties. If restrictions as stated above permit Pérez from performing his job duties, he can return to work without restrictions on 7/31/2025.     Please contact me for questions or concerns.      Sincerely,    Electronically signed by Irena Chairez APRN *

## 2025-07-24 NOTE — DISCHARGE INSTRUCTIONS
[facility]  Cardiac Electrophysiology  [address]  [place] [address]   Office: [phone]  Fax: [phone]     Cardiac Electrophysiology - Loop Recorder Implantation Discharge Instructions      PROCEDURE   ILR (implantable loop recorder) placement         MEDICATION INSTRUCTIONS   Continue current medications           WOUND CARE   Leave the large overlying dressing in place until 3 days after discharge - this dressing can thereafter be removed by gently pulling off.    Underneath the large dressing will be steri-strips. DO NOT REMOVE these strips; please leave these in place until you are seen in clinic.  DO NOT COVER the site with any bandages or dressings. The site should be kept dry for 3-5 days - please avoid traditional showers or baths during this time.  Keep the site clean and dry.  No swimming, sauna, or hot tub use until incision is completely healed.         ACTIVITY   It takes approximately 1-2 weeks for your incision to heal.  During this time use extra precautions - please avoid the following:  Raising your arm over your head or stretching behind your back (no hyperflexion)  Pushing or pulling (mowing the lawn, vacuuming)  Lifting anything heavier than 10 pounds or a gallon of milk  Sudden or extreme motions  Be physically active every day.  Moving your arm is important         REMOTE MONITORING   You will receive a remote transmission station to monitor your device from home  Please set the transmitter up as soon as you get home from the hospital.  Directions are included in the box, and you may call our office or the company technical support line if you need assistance connecting your transmitter.  Please send a transmission the day after you go home  Automated transmissions will be sent every 3 months or sooner if device issues are detected.  You may manually send in transmissions if you are having arrhythmia symptoms or think there may be a problem  with your device.  Please call our office at [phone] if you send in a transmission off-schedule         WHAT TO WATCH OUT FOR   Contact our office or seek emergency care for any of the following:  Drainage, bleeding or oozing from the site  Redness, increased swelling, or warmth around the device site  Pain not treated with prescribed pain medication  Temperature greater than 100.4F  Chest pain, shortness of breath, dizziness/fainting         FOLLOW-UP   Our office will coordinate a follow-up visit visit in clinic for a device interrogation and an incision check 7-14 days after your procedure.     Please contact us at [phone] with any issues during your recovery.

## 2025-07-31 ENCOUNTER — ANCILLARY PROCEDURE (OUTPATIENT)
Dept: CARDIOLOGY | Facility: CLINIC | Age: 55
End: 2025-07-31
Attending: INTERNAL MEDICINE
Payer: COMMERCIAL

## 2025-07-31 DIAGNOSIS — Z86.73 HISTORY OF STROKE: Primary | ICD-10-CM

## 2025-07-31 DIAGNOSIS — Z95.818 STATUS POST PLACEMENT OF IMPLANTABLE LOOP RECORDER: ICD-10-CM

## 2025-07-31 LAB
MDC_IDC_PG_IMPLANT_DTM: NORMAL
MDC_IDC_PG_MFG: NORMAL
MDC_IDC_PG_MODEL: NORMAL
MDC_IDC_PG_SERIAL: NORMAL
MDC_IDC_PG_TYPE: NORMAL
MDC_IDC_SESS_CLINIC_NAME: NORMAL
MDC_IDC_SESS_DTM: NORMAL
MDC_IDC_SESS_TYPE: NORMAL

## 2025-08-03 PROCEDURE — 93291 INTERROG DEV EVAL SCRMS IP: CPT | Performed by: INTERNAL MEDICINE

## 2025-08-09 ENCOUNTER — HEALTH MAINTENANCE LETTER (OUTPATIENT)
Age: 55
End: 2025-08-09

## 2025-08-19 ENCOUNTER — OFFICE VISIT (OUTPATIENT)
Dept: CARDIOLOGY | Facility: CLINIC | Age: 55
End: 2025-08-19
Attending: STUDENT IN AN ORGANIZED HEALTH CARE EDUCATION/TRAINING PROGRAM
Payer: COMMERCIAL

## 2025-08-19 VITALS
HEIGHT: 63 IN | HEART RATE: 67 BPM | SYSTOLIC BLOOD PRESSURE: 124 MMHG | BODY MASS INDEX: 24.63 KG/M2 | WEIGHT: 139 LBS | DIASTOLIC BLOOD PRESSURE: 60 MMHG | RESPIRATION RATE: 16 BRPM

## 2025-08-19 DIAGNOSIS — Z86.73 HISTORY OF STROKE: ICD-10-CM

## 2025-08-19 DIAGNOSIS — I51.7 ASYMMETRIC SEPTAL HYPERTROPHY: Primary | ICD-10-CM

## 2025-08-19 DIAGNOSIS — Z95.818 STATUS POST PLACEMENT OF IMPLANTABLE LOOP RECORDER: ICD-10-CM

## 2025-08-19 PROCEDURE — 3074F SYST BP LT 130 MM HG: CPT

## 2025-08-19 PROCEDURE — G2211 COMPLEX E/M VISIT ADD ON: HCPCS

## 2025-08-19 PROCEDURE — 3078F DIAST BP <80 MM HG: CPT

## 2025-08-19 PROCEDURE — 99204 OFFICE O/P NEW MOD 45 MIN: CPT

## 2025-08-19 RX ORDER — INSULIN GLARGINE-YFGN 100 [IU]/ML
12 INJECTION, SOLUTION SUBCUTANEOUS AT BEDTIME
COMMUNITY
Start: 2025-01-20

## 2025-08-20 ENCOUNTER — TELEPHONE (OUTPATIENT)
Dept: CARDIOLOGY | Facility: CLINIC | Age: 55
End: 2025-08-20
Payer: COMMERCIAL

## (undated) RX ORDER — LIDOCAINE HYDROCHLORIDE AND EPINEPHRINE 10; 10 MG/ML; UG/ML
INJECTION, SOLUTION INFILTRATION; PERINEURAL
Status: DISPENSED
Start: 2025-07-24